# Patient Record
Sex: FEMALE | Race: WHITE
[De-identification: names, ages, dates, MRNs, and addresses within clinical notes are randomized per-mention and may not be internally consistent; named-entity substitution may affect disease eponyms.]

---

## 2017-05-01 ENCOUNTER — HOSPITAL ENCOUNTER (OUTPATIENT)
Dept: HOSPITAL 23 - CAMB | Age: 67
Discharge: HOME | End: 2017-05-01
Payer: COMMERCIAL

## 2017-05-01 DIAGNOSIS — M85.88: ICD-10-CM

## 2017-05-01 DIAGNOSIS — M16.12: ICD-10-CM

## 2017-05-01 DIAGNOSIS — Z01.818: Primary | ICD-10-CM

## 2017-05-01 LAB
BARBITURATES UR QL SCN: 0.7 MG/DL (ref 0.2–2)
BARBITURATES UR QL SCN: 4.2 G/DL (ref 3.5–5)
BENZODIAZ UR QL SCN: 18 U/L (ref 10–40)
BENZODIAZ UR QL SCN: 20 U/L (ref 10–42)
BLOOD UREA NITROGEN: 15 MG/DL (ref 9–23)
BUN/CREATININE RATIO: 21.42
BZE UR QL SCN: 121 U/L (ref 32–92)
CALCIUM SERUM: 9.8 MG/DL (ref 8.4–10.2)
CK MB SERPL-RTO: 12.5 % (ref 11–15.5)
CK MB SERPL-RTO: 32.4 G/DL (ref 30–36)
CREATININE SERUM: 0.7 MG/DL (ref 0.6–1.4)
GENTAMICIN PEAK SERPL-MCNC: NO MG/L
GLOM FILT RATE ESTIMATED: 90.9 ML/MIN (ref 60–?)
GLUCOSE FASTING: 93 MG/DL (ref 70–110)
HEMATOCRIT: 42.6 % (ref 35–45)
HEMOGLOBIN: 13.8 GM/DL (ref 12–16)
INR: 0.9
KETONES UR QL: 107 MMOL/L (ref 100–111)
KETONES UR QL: 25 MMOL/L (ref 22–31)
MEAN CELL VOLUME: 92.3 FL (ref 83–96)
MEAN CORPUSCULAR HEMOGLOBIN: 29.9 PG (ref 28–34)
MEAN PLATELET VOLUME: 8.6 FL (ref 6.5–11.5)
PLATELET COUNT: 289 X10E3 (ref 140–420)
POTASSIUM: 4.3 MMOL/L (ref 3.5–5.1)
PROTEIN TOTAL SERUM: 6.8 G/DL (ref 6–8.3)
PROTHROMBIN TIME (PATIENT): 9.4 SECONDS (ref 9.6–11.5)
RED BLOOD COUNT: 4.62 X10E (ref 3.9–5.3)
SODIUM: 141 MMOL/L (ref 135–145)
URINE APPEARANCE: CLEAR
URINE BILIRUBIN: (no result)
URINE BLOOD: (no result)
URINE COLOR: YELLOW
URINE GLUCOSE: (no result) MG/DL
URINE KETONE: (no result)
URINE LEUKOCYTE ESTERASE: (no result)
URINE NITRATE: (no result)
URINE PH: 7 (ref 5–8)
URINE PROTEIN: (no result)
URINE SOURCE: (no result)
URINE SPECIFIC GRAVITY: 1.01 (ref 1–1.03)
URINE UROBILINOGEN: 0.2 MG/DL
WHITE BLOOD COUNT: 8.8 X10E3 (ref 4–10.5)

## 2017-05-08 ENCOUNTER — HOSPITAL ENCOUNTER (INPATIENT)
Dept: HOSPITAL 23 - CSUR | Age: 67
LOS: 1 days | Discharge: HOME HEALTH | DRG: 470 | End: 2017-05-09
Attending: ORTHOPAEDIC SURGERY | Admitting: ORTHOPAEDIC SURGERY
Payer: MEDICARE

## 2017-05-08 DIAGNOSIS — M16.12: Primary | ICD-10-CM

## 2017-05-08 DIAGNOSIS — Z90.49: ICD-10-CM

## 2017-05-08 DIAGNOSIS — M87.9: ICD-10-CM

## 2017-05-08 LAB
INR: 0.9
PROTHROMBIN TIME (PATIENT): 9.9 SECONDS

## 2017-05-08 PROCEDURE — C1776 JOINT DEVICE (IMPLANTABLE): HCPCS

## 2017-05-08 PROCEDURE — 0SRB0J9 REPLACEMENT OF LEFT HIP JOINT WITH SYNTHETIC SUBSTITUTE, CEMENTED, OPEN APPROACH: ICD-10-PCS | Performed by: ORTHOPAEDIC SURGERY

## 2017-05-08 PROCEDURE — 0QP904Z REMOVAL OF INTERNAL FIXATION DEVICE FROM LEFT FEMORAL SHAFT, OPEN APPROACH: ICD-10-PCS | Performed by: ORTHOPAEDIC SURGERY

## 2017-05-09 LAB
BASOPHIL#: 0 X10E3
BASOPHIL%: 0.5 %
BLOOD UREA NITROGEN: 12 MG/DL
BUN/CREATININE RATIO: 17.14
CALCIUM SERUM: 8.3 MG/DL
CK MB SERPL-RTO: 12.3 %
CK MB SERPL-RTO: 32.9 G/DL
CREATININE SERUM: 0.7 MG/DL
DIFF IND: NO
EOSINOPHIL#: 0.1 X10E3
EOSINOPHIL%: 1.2 %
GLOM FILT RATE ESTIMATED: 90.9 ML/MIN
GLUCOSE FASTING: 114 MG/DL
HEMATOCRIT: 30.6 %
HEMOGLOBIN: 10.1 GM/DL
KETONES UR QL: 103 MMOL/L
KETONES UR QL: 25 MMOL/L
LYMPHOCYTE#: 1.5 X10E3
LYMPHOCYTE%: 18.9 %
MAGNESIUM: 1.8 MG/DL
MEAN CELL VOLUME: 92.4 FL
MEAN CORPUSCULAR HEMOGLOBIN: 30.4 PG
MEAN PLATELET VOLUME: 10 FL
MONOCYTE#: 0.7 X10E3
MONOCYTE%: 8.4 %
NEUTROPHIL#: 5.7 X10E3
NEUTROPHIL%: 71 %
PLATELET COUNT: 122 X10E3
POTASSIUM: 4.4 MMOL/L
RED BLOOD COUNT: 3.31 X10E
SODIUM: 135 MMOL/L
WHITE BLOOD COUNT: 8.1 X10E3

## 2019-12-14 ENCOUNTER — HOSPITAL ENCOUNTER (INPATIENT)
Facility: HOSPITAL | Age: 69
LOS: 3 days | Discharge: HOME HEALTH | End: 2019-12-17
Attending: INTERNAL MEDICINE | Admitting: ORTHOPAEDIC SURGERY

## 2019-12-14 DIAGNOSIS — Z96.649 PERIPROSTHETIC FRACTURE OF SHAFT OF FEMUR: Primary | ICD-10-CM

## 2019-12-14 DIAGNOSIS — M79.605 LEFT LEG PAIN: ICD-10-CM

## 2019-12-14 DIAGNOSIS — M97.8XXA PERIPROSTHETIC FRACTURE OF SHAFT OF FEMUR: Primary | ICD-10-CM

## 2019-12-14 PROCEDURE — 25010000002 MORPHINE PER 10 MG: Performed by: INTERNAL MEDICINE

## 2019-12-14 RX ORDER — OXYCODONE HYDROCHLORIDE AND ACETAMINOPHEN 5; 325 MG/1; MG/1
1 TABLET ORAL EVERY 4 HOURS PRN
Status: DISCONTINUED | OUTPATIENT
Start: 2019-12-14 | End: 2019-12-16

## 2019-12-14 RX ORDER — MORPHINE SULFATE 2 MG/ML
2 INJECTION, SOLUTION INTRAMUSCULAR; INTRAVENOUS EVERY 4 HOURS PRN
Status: DISCONTINUED | OUTPATIENT
Start: 2019-12-14 | End: 2019-12-17 | Stop reason: HOSPADM

## 2019-12-14 RX ORDER — SODIUM CHLORIDE 9 MG/ML
75 INJECTION, SOLUTION INTRAVENOUS CONTINUOUS
Status: DISCONTINUED | OUTPATIENT
Start: 2019-12-14 | End: 2019-12-16

## 2019-12-14 RX ORDER — SODIUM CHLORIDE 0.9 % (FLUSH) 0.9 %
10 SYRINGE (ML) INJECTION AS NEEDED
Status: DISCONTINUED | OUTPATIENT
Start: 2019-12-14 | End: 2019-12-17 | Stop reason: HOSPADM

## 2019-12-14 RX ORDER — ONDANSETRON 2 MG/ML
4 INJECTION INTRAMUSCULAR; INTRAVENOUS EVERY 6 HOURS PRN
Status: DISCONTINUED | OUTPATIENT
Start: 2019-12-14 | End: 2019-12-17 | Stop reason: HOSPADM

## 2019-12-14 RX ORDER — SODIUM CHLORIDE 0.9 % (FLUSH) 0.9 %
10 SYRINGE (ML) INJECTION EVERY 12 HOURS SCHEDULED
Status: DISCONTINUED | OUTPATIENT
Start: 2019-12-14 | End: 2019-12-17 | Stop reason: HOSPADM

## 2019-12-14 RX ADMIN — OXYCODONE AND ACETAMINOPHEN 1 TABLET: 5; 325 TABLET ORAL at 20:32

## 2019-12-14 RX ADMIN — MORPHINE SULFATE 2 MG: 2 INJECTION, SOLUTION INTRAMUSCULAR; INTRAVENOUS at 23:04

## 2019-12-14 RX ADMIN — SODIUM CHLORIDE 75 ML/HR: 9 INJECTION, SOLUTION INTRAVENOUS at 22:38

## 2019-12-15 ENCOUNTER — APPOINTMENT (OUTPATIENT)
Dept: GENERAL RADIOLOGY | Facility: HOSPITAL | Age: 69
End: 2019-12-15

## 2019-12-15 ENCOUNTER — ANESTHESIA (OUTPATIENT)
Dept: PERIOP | Facility: HOSPITAL | Age: 69
End: 2019-12-15

## 2019-12-15 ENCOUNTER — ANESTHESIA EVENT (OUTPATIENT)
Dept: PERIOP | Facility: HOSPITAL | Age: 69
End: 2019-12-15

## 2019-12-15 PROBLEM — M79.605 LEFT LEG PAIN: Status: ACTIVE | Noted: 2019-12-15

## 2019-12-15 LAB
ALBUMIN SERPL-MCNC: 3.5 G/DL (ref 3.5–5.2)
ALBUMIN/GLOB SERPL: 1.2 G/DL
ALP SERPL-CCNC: 313 U/L (ref 39–117)
ALT SERPL W P-5'-P-CCNC: 129 U/L (ref 1–33)
ANION GAP SERPL CALCULATED.3IONS-SCNC: 13.2 MMOL/L (ref 5–15)
AST SERPL-CCNC: 108 U/L (ref 1–32)
BASOPHILS # BLD AUTO: 0.03 10*3/MM3 (ref 0–0.2)
BASOPHILS NFR BLD AUTO: 0.4 % (ref 0–1.5)
BILIRUB SERPL-MCNC: 0.6 MG/DL (ref 0.2–1.2)
BUN BLD-MCNC: 17 MG/DL (ref 8–23)
BUN/CREAT SERPL: 29.8 (ref 7–25)
CALCIUM SPEC-SCNC: 8.8 MG/DL (ref 8.6–10.5)
CHLORIDE SERPL-SCNC: 103 MMOL/L (ref 98–107)
CO2 SERPL-SCNC: 23.8 MMOL/L (ref 22–29)
CREAT BLD-MCNC: 0.57 MG/DL (ref 0.57–1)
DEPRECATED RDW RBC AUTO: 43.8 FL (ref 37–54)
EOSINOPHIL # BLD AUTO: 0.06 10*3/MM3 (ref 0–0.4)
EOSINOPHIL NFR BLD AUTO: 0.8 % (ref 0.3–6.2)
ERYTHROCYTE [DISTWIDTH] IN BLOOD BY AUTOMATED COUNT: 13.2 % (ref 12.3–15.4)
GFR SERPL CREATININE-BSD FRML MDRD: 105 ML/MIN/1.73
GLOBULIN UR ELPH-MCNC: 2.9 GM/DL
GLUCOSE BLD-MCNC: 90 MG/DL (ref 65–99)
HCT VFR BLD AUTO: 34.8 % (ref 34–46.6)
HGB BLD-MCNC: 11.4 G/DL (ref 12–15.9)
IMM GRANULOCYTES # BLD AUTO: 0.02 10*3/MM3 (ref 0–0.05)
IMM GRANULOCYTES NFR BLD AUTO: 0.3 % (ref 0–0.5)
LYMPHOCYTES # BLD AUTO: 1.27 10*3/MM3 (ref 0.7–3.1)
LYMPHOCYTES NFR BLD AUTO: 17.7 % (ref 19.6–45.3)
MCH RBC QN AUTO: 29.4 PG (ref 26.6–33)
MCHC RBC AUTO-ENTMCNC: 32.8 G/DL (ref 31.5–35.7)
MCV RBC AUTO: 89.7 FL (ref 79–97)
MONOCYTES # BLD AUTO: 0.6 10*3/MM3 (ref 0.1–0.9)
MONOCYTES NFR BLD AUTO: 8.4 % (ref 5–12)
NEUTROPHILS # BLD AUTO: 5.19 10*3/MM3 (ref 1.7–7)
NEUTROPHILS NFR BLD AUTO: 72.4 % (ref 42.7–76)
NRBC BLD AUTO-RTO: 0 /100 WBC (ref 0–0.2)
PLATELET # BLD AUTO: 309 10*3/MM3 (ref 140–450)
PMV BLD AUTO: 9.7 FL (ref 6–12)
POTASSIUM BLD-SCNC: 4.1 MMOL/L (ref 3.5–5.2)
PROT SERPL-MCNC: 6.4 G/DL (ref 6–8.5)
RBC # BLD AUTO: 3.88 10*6/MM3 (ref 3.77–5.28)
SODIUM BLD-SCNC: 140 MMOL/L (ref 136–145)
WBC NRBC COR # BLD: 7.17 10*3/MM3 (ref 3.4–10.8)

## 2019-12-15 PROCEDURE — C1713 ANCHOR/SCREW BN/BN,TIS/BN: HCPCS | Performed by: ORTHOPAEDIC SURGERY

## 2019-12-15 PROCEDURE — 73552 X-RAY EXAM OF FEMUR 2/>: CPT

## 2019-12-15 PROCEDURE — 80053 COMPREHEN METABOLIC PANEL: CPT | Performed by: INTERNAL MEDICINE

## 2019-12-15 PROCEDURE — 25010000002 MIDAZOLAM PER 1 MG: Performed by: ANESTHESIOLOGY

## 2019-12-15 PROCEDURE — 73560 X-RAY EXAM OF KNEE 1 OR 2: CPT

## 2019-12-15 PROCEDURE — 76000 FLUOROSCOPY <1 HR PHYS/QHP: CPT

## 2019-12-15 PROCEDURE — 25010000002 HYDROMORPHONE PER 4 MG: Performed by: ANESTHESIOLOGY

## 2019-12-15 PROCEDURE — 0SPB04Z REMOVAL OF INTERNAL FIXATION DEVICE FROM LEFT HIP JOINT, OPEN APPROACH: ICD-10-PCS | Performed by: ORTHOPAEDIC SURGERY

## 2019-12-15 PROCEDURE — 0SHB04Z INSERTION OF INTERNAL FIXATION DEVICE INTO LEFT HIP JOINT, OPEN APPROACH: ICD-10-PCS | Performed by: ORTHOPAEDIC SURGERY

## 2019-12-15 PROCEDURE — 25010000002 MORPHINE PER 10 MG: Performed by: INTERNAL MEDICINE

## 2019-12-15 PROCEDURE — 93005 ELECTROCARDIOGRAM TRACING: CPT | Performed by: ORTHOPAEDIC SURGERY

## 2019-12-15 PROCEDURE — 25010000002 ONDANSETRON PER 1 MG: Performed by: ORTHOPAEDIC SURGERY

## 2019-12-15 PROCEDURE — 73502 X-RAY EXAM HIP UNI 2-3 VIEWS: CPT

## 2019-12-15 PROCEDURE — 25010000003 CEFAZOLIN IN DEXTROSE 2-4 GM/100ML-% SOLUTION: Performed by: ORTHOPAEDIC SURGERY

## 2019-12-15 PROCEDURE — 25010000002 PROPOFOL 10 MG/ML EMULSION: Performed by: ANESTHESIOLOGY

## 2019-12-15 PROCEDURE — 85025 COMPLETE CBC W/AUTO DIFF WBC: CPT | Performed by: INTERNAL MEDICINE

## 2019-12-15 PROCEDURE — 93010 ELECTROCARDIOGRAM REPORT: CPT | Performed by: INTERNAL MEDICINE

## 2019-12-15 PROCEDURE — 71046 X-RAY EXAM CHEST 2 VIEWS: CPT

## 2019-12-15 PROCEDURE — 25010000002 FENTANYL CITRATE (PF) 100 MCG/2ML SOLUTION: Performed by: ANESTHESIOLOGY

## 2019-12-15 PROCEDURE — 25010000002 KETOROLAC TROMETHAMINE PER 15 MG: Performed by: ORTHOPAEDIC SURGERY

## 2019-12-15 PROCEDURE — 72170 X-RAY EXAM OF PELVIS: CPT

## 2019-12-15 DEVICE — NCB SCREW D 4.0 SELF-TAPPING, 42
Type: IMPLANTABLE DEVICE | Site: FEMUR | Status: FUNCTIONAL
Brand: NCB®

## 2019-12-15 DEVICE — CABL CERCLG GTR COCR 1.8MM 63.5CM: Type: IMPLANTABLE DEVICE | Site: FEMUR | Status: FUNCTIONAL

## 2019-12-15 DEVICE — NCB  SCREW 5.0, L = 34
Type: IMPLANTABLE DEVICE | Site: FEMUR | Status: FUNCTIONAL
Brand: NCB®

## 2019-12-15 DEVICE — NCB SCREW 5.0, L = 30
Type: IMPLANTABLE DEVICE | Site: FEMUR | Status: FUNCTIONAL
Brand: NCB®

## 2019-12-15 DEVICE — NCB CLAMP-SCREW
Type: IMPLANTABLE DEVICE | Site: FEMUR | Status: FUNCTIONAL
Brand: NCB®

## 2019-12-15 DEVICE — NCB SCREW D 4.0 SELF-TAPPING, 46
Type: IMPLANTABLE DEVICE | Site: FEMUR | Status: FUNCTIONAL
Brand: NCB®

## 2019-12-15 DEVICE — NCB SCREW D 4.0 SELF-TAPPING, 44
Type: IMPLANTABLE DEVICE | Site: FEMUR | Status: FUNCTIONAL
Brand: NCB®

## 2019-12-15 DEVICE — NCB SCREW 5.0   L = 36
Type: IMPLANTABLE DEVICE | Site: FEMUR | Status: FUNCTIONAL
Brand: NCB®

## 2019-12-15 DEVICE — NCB SCREW D 4.0 SELF-TAPPING, 48
Type: IMPLANTABLE DEVICE | Site: FEMUR | Status: FUNCTIONAL
Brand: NCB®

## 2019-12-15 DEVICE — NCB SCREW D 4.0 SELF-TAPPING, 32
Type: IMPLANTABLE DEVICE | Site: FEMUR | Status: FUNCTIONAL
Brand: NCB®

## 2019-12-15 DEVICE — NCB SCREW 5.0   L = 34
Type: IMPLANTABLE DEVICE | Site: FEMUR | Status: FUNCTIONAL
Brand: NCB®

## 2019-12-15 DEVICE — NCB SCREW D 4.0 SELF-TAPPING, 26
Type: IMPLANTABLE DEVICE | Site: FEMUR | Status: FUNCTIONAL
Brand: NCB®

## 2019-12-15 DEVICE — NCB SCREW D 4.0 SELF-TAPPING, 38
Type: IMPLANTABLE DEVICE | Site: FEMUR | Status: FUNCTIONAL
Brand: NCB®

## 2019-12-15 DEVICE — NCB SCREW D 4.0 SELF-TAPPING, 34
Type: IMPLANTABLE DEVICE | Site: FEMUR | Status: FUNCTIONAL
Brand: NCB®

## 2019-12-15 DEVICE — NCB LOCKING CAP
Type: IMPLANTABLE DEVICE | Site: FEMUR | Status: FUNCTIONAL
Brand: NCB®

## 2019-12-15 DEVICE — BONE CCCS 20 TO 80 30CC: Type: IMPLANTABLE DEVICE | Site: FEMUR | Status: FUNCTIONAL

## 2019-12-15 DEVICE — NCB PP PROX FEM PLATE, L,15 H, L. 324MM
Type: IMPLANTABLE DEVICE | Site: FEMUR | Status: FUNCTIONAL
Brand: NCB®

## 2019-12-15 DEVICE — NCB SCREW 5.0   L = 42
Type: IMPLANTABLE DEVICE | Site: FEMUR | Status: FUNCTIONAL
Brand: NCB®

## 2019-12-15 DEVICE — NCB SCREW 5.0   L = 48
Type: IMPLANTABLE DEVICE | Site: FEMUR | Status: FUNCTIONAL
Brand: NCB®

## 2019-12-15 DEVICE — IMPLANTABLE DEVICE: Type: IMPLANTABLE DEVICE | Site: FEMUR | Status: FUNCTIONAL

## 2019-12-15 RX ORDER — PROMETHAZINE HYDROCHLORIDE 25 MG/ML
6.25 INJECTION, SOLUTION INTRAMUSCULAR; INTRAVENOUS
Status: DISCONTINUED | OUTPATIENT
Start: 2019-12-15 | End: 2019-12-15

## 2019-12-15 RX ORDER — HYDROCODONE BITARTRATE AND ACETAMINOPHEN 7.5; 325 MG/1; MG/1
1 TABLET ORAL ONCE AS NEEDED
Status: DISCONTINUED | OUTPATIENT
Start: 2019-12-15 | End: 2019-12-15

## 2019-12-15 RX ORDER — FLUMAZENIL 0.1 MG/ML
0.2 INJECTION INTRAVENOUS AS NEEDED
Status: DISCONTINUED | OUTPATIENT
Start: 2019-12-15 | End: 2019-12-15

## 2019-12-15 RX ORDER — LIDOCAINE HYDROCHLORIDE 10 MG/ML
0.5 INJECTION, SOLUTION EPIDURAL; INFILTRATION; INTRACAUDAL; PERINEURAL ONCE AS NEEDED
Status: DISCONTINUED | OUTPATIENT
Start: 2019-12-15 | End: 2019-12-15 | Stop reason: HOSPADM

## 2019-12-15 RX ORDER — SODIUM CHLORIDE, SODIUM LACTATE, POTASSIUM CHLORIDE, CALCIUM CHLORIDE 600; 310; 30; 20 MG/100ML; MG/100ML; MG/100ML; MG/100ML
9 INJECTION, SOLUTION INTRAVENOUS CONTINUOUS
Status: DISCONTINUED | OUTPATIENT
Start: 2019-12-15 | End: 2019-12-16

## 2019-12-15 RX ORDER — KETAMINE HYDROCHLORIDE 10 MG/ML
INJECTION INTRAMUSCULAR; INTRAVENOUS AS NEEDED
Status: DISCONTINUED | OUTPATIENT
Start: 2019-12-15 | End: 2019-12-15 | Stop reason: SURG

## 2019-12-15 RX ORDER — HYDRALAZINE HYDROCHLORIDE 20 MG/ML
5 INJECTION INTRAMUSCULAR; INTRAVENOUS
Status: DISCONTINUED | OUTPATIENT
Start: 2019-12-15 | End: 2019-12-15

## 2019-12-15 RX ORDER — LABETALOL HYDROCHLORIDE 5 MG/ML
5 INJECTION, SOLUTION INTRAVENOUS
Status: DISCONTINUED | OUTPATIENT
Start: 2019-12-15 | End: 2019-12-15

## 2019-12-15 RX ORDER — NALOXONE HCL 0.4 MG/ML
0.2 VIAL (ML) INJECTION AS NEEDED
Status: DISCONTINUED | OUTPATIENT
Start: 2019-12-15 | End: 2019-12-15

## 2019-12-15 RX ORDER — FAMOTIDINE 20 MG/1
40 TABLET, FILM COATED ORAL DAILY
Status: DISCONTINUED | OUTPATIENT
Start: 2019-12-16 | End: 2019-12-17 | Stop reason: HOSPADM

## 2019-12-15 RX ORDER — SODIUM CHLORIDE 0.9 % (FLUSH) 0.9 %
3-10 SYRINGE (ML) INJECTION AS NEEDED
Status: DISCONTINUED | OUTPATIENT
Start: 2019-12-15 | End: 2019-12-15 | Stop reason: HOSPADM

## 2019-12-15 RX ORDER — UREA 10 %
1 LOTION (ML) TOPICAL NIGHTLY PRN
Status: DISCONTINUED | OUTPATIENT
Start: 2019-12-15 | End: 2019-12-17 | Stop reason: HOSPADM

## 2019-12-15 RX ORDER — PROMETHAZINE HYDROCHLORIDE 25 MG/1
25 SUPPOSITORY RECTAL ONCE AS NEEDED
Status: DISCONTINUED | OUTPATIENT
Start: 2019-12-15 | End: 2019-12-15

## 2019-12-15 RX ORDER — ACETAMINOPHEN 325 MG/1
650 TABLET ORAL ONCE AS NEEDED
Status: DISCONTINUED | OUTPATIENT
Start: 2019-12-15 | End: 2019-12-15

## 2019-12-15 RX ORDER — LIDOCAINE HYDROCHLORIDE 20 MG/ML
INJECTION, SOLUTION INFILTRATION; PERINEURAL AS NEEDED
Status: DISCONTINUED | OUTPATIENT
Start: 2019-12-15 | End: 2019-12-15 | Stop reason: SURG

## 2019-12-15 RX ORDER — ONDANSETRON 4 MG/1
4 TABLET, FILM COATED ORAL EVERY 6 HOURS PRN
Status: DISCONTINUED | OUTPATIENT
Start: 2019-12-15 | End: 2019-12-17 | Stop reason: HOSPADM

## 2019-12-15 RX ORDER — HYDROMORPHONE HYDROCHLORIDE 1 MG/ML
0.5 INJECTION, SOLUTION INTRAMUSCULAR; INTRAVENOUS; SUBCUTANEOUS
Status: DISCONTINUED | OUTPATIENT
Start: 2019-12-15 | End: 2019-12-15

## 2019-12-15 RX ORDER — SODIUM CHLORIDE, SODIUM LACTATE, POTASSIUM CHLORIDE, CALCIUM CHLORIDE 600; 310; 30; 20 MG/100ML; MG/100ML; MG/100ML; MG/100ML
100 INJECTION, SOLUTION INTRAVENOUS CONTINUOUS
Status: DISCONTINUED | OUTPATIENT
Start: 2019-12-15 | End: 2019-12-16

## 2019-12-15 RX ORDER — MAGNESIUM HYDROXIDE 1200 MG/15ML
LIQUID ORAL AS NEEDED
Status: DISCONTINUED | OUTPATIENT
Start: 2019-12-15 | End: 2019-12-15 | Stop reason: HOSPADM

## 2019-12-15 RX ORDER — OXYCODONE AND ACETAMINOPHEN 10; 325 MG/1; MG/1
1 TABLET ORAL EVERY 4 HOURS PRN
Status: DISCONTINUED | OUTPATIENT
Start: 2019-12-15 | End: 2019-12-17 | Stop reason: HOSPADM

## 2019-12-15 RX ORDER — ONDANSETRON 2 MG/ML
4 INJECTION INTRAMUSCULAR; INTRAVENOUS EVERY 6 HOURS PRN
Status: DISCONTINUED | OUTPATIENT
Start: 2019-12-15 | End: 2019-12-17 | Stop reason: HOSPADM

## 2019-12-15 RX ORDER — OXYCODONE AND ACETAMINOPHEN 7.5; 325 MG/1; MG/1
1 TABLET ORAL ONCE AS NEEDED
Status: DISCONTINUED | OUTPATIENT
Start: 2019-12-15 | End: 2019-12-15

## 2019-12-15 RX ORDER — PROPOFOL 10 MG/ML
VIAL (ML) INTRAVENOUS AS NEEDED
Status: DISCONTINUED | OUTPATIENT
Start: 2019-12-15 | End: 2019-12-15 | Stop reason: SURG

## 2019-12-15 RX ORDER — CEFAZOLIN SODIUM 2 G/100ML
2 INJECTION, SOLUTION INTRAVENOUS EVERY 8 HOURS
Status: COMPLETED | OUTPATIENT
Start: 2019-12-16 | End: 2019-12-16

## 2019-12-15 RX ORDER — CEFAZOLIN SODIUM 2 G/100ML
2 INJECTION, SOLUTION INTRAVENOUS ONCE
Status: COMPLETED | OUTPATIENT
Start: 2019-12-15 | End: 2019-12-15

## 2019-12-15 RX ORDER — KETOROLAC TROMETHAMINE 30 MG/ML
15 INJECTION, SOLUTION INTRAMUSCULAR; INTRAVENOUS EVERY 6 HOURS PRN
Status: DISCONTINUED | OUTPATIENT
Start: 2019-12-15 | End: 2019-12-17

## 2019-12-15 RX ORDER — PROMETHAZINE HYDROCHLORIDE 25 MG/ML
12.5 INJECTION, SOLUTION INTRAMUSCULAR; INTRAVENOUS ONCE AS NEEDED
Status: DISCONTINUED | OUTPATIENT
Start: 2019-12-15 | End: 2019-12-15

## 2019-12-15 RX ORDER — SODIUM CHLORIDE 0.9 % (FLUSH) 0.9 %
3 SYRINGE (ML) INJECTION EVERY 12 HOURS SCHEDULED
Status: DISCONTINUED | OUTPATIENT
Start: 2019-12-15 | End: 2019-12-15 | Stop reason: HOSPADM

## 2019-12-15 RX ORDER — ONDANSETRON 2 MG/ML
4 INJECTION INTRAMUSCULAR; INTRAVENOUS ONCE AS NEEDED
Status: DISCONTINUED | OUTPATIENT
Start: 2019-12-15 | End: 2019-12-15

## 2019-12-15 RX ORDER — DIPHENHYDRAMINE HCL 25 MG
25 CAPSULE ORAL
Status: DISCONTINUED | OUTPATIENT
Start: 2019-12-15 | End: 2019-12-15

## 2019-12-15 RX ORDER — PROMETHAZINE HYDROCHLORIDE 25 MG/1
25 TABLET ORAL ONCE AS NEEDED
Status: DISCONTINUED | OUTPATIENT
Start: 2019-12-15 | End: 2019-12-15

## 2019-12-15 RX ORDER — NALOXONE HCL 0.4 MG/ML
0.1 VIAL (ML) INJECTION
Status: DISCONTINUED | OUTPATIENT
Start: 2019-12-15 | End: 2019-12-16

## 2019-12-15 RX ORDER — FENTANYL CITRATE 50 UG/ML
50 INJECTION, SOLUTION INTRAMUSCULAR; INTRAVENOUS
Status: DISCONTINUED | OUTPATIENT
Start: 2019-12-15 | End: 2019-12-15

## 2019-12-15 RX ORDER — ACETAMINOPHEN 325 MG/1
325 TABLET ORAL EVERY 4 HOURS PRN
Status: DISCONTINUED | OUTPATIENT
Start: 2019-12-15 | End: 2019-12-17 | Stop reason: HOSPADM

## 2019-12-15 RX ORDER — ROCURONIUM BROMIDE 10 MG/ML
INJECTION, SOLUTION INTRAVENOUS AS NEEDED
Status: DISCONTINUED | OUTPATIENT
Start: 2019-12-15 | End: 2019-12-15 | Stop reason: SURG

## 2019-12-15 RX ORDER — SENNA AND DOCUSATE SODIUM 50; 8.6 MG/1; MG/1
2 TABLET, FILM COATED ORAL 2 TIMES DAILY
Status: DISCONTINUED | OUTPATIENT
Start: 2019-12-15 | End: 2019-12-17 | Stop reason: HOSPADM

## 2019-12-15 RX ORDER — SODIUM CHLORIDE 0.9 % (FLUSH) 0.9 %
3-10 SYRINGE (ML) INJECTION AS NEEDED
Status: DISCONTINUED | OUTPATIENT
Start: 2019-12-15 | End: 2019-12-17 | Stop reason: HOSPADM

## 2019-12-15 RX ORDER — BISACODYL 10 MG
10 SUPPOSITORY, RECTAL RECTAL DAILY PRN
Status: DISCONTINUED | OUTPATIENT
Start: 2019-12-15 | End: 2019-12-17 | Stop reason: HOSPADM

## 2019-12-15 RX ORDER — DIPHENHYDRAMINE HYDROCHLORIDE 50 MG/ML
12.5 INJECTION INTRAMUSCULAR; INTRAVENOUS
Status: DISCONTINUED | OUTPATIENT
Start: 2019-12-15 | End: 2019-12-15

## 2019-12-15 RX ORDER — MIDAZOLAM HYDROCHLORIDE 1 MG/ML
2 INJECTION INTRAMUSCULAR; INTRAVENOUS
Status: DISCONTINUED | OUTPATIENT
Start: 2019-12-15 | End: 2019-12-15 | Stop reason: HOSPADM

## 2019-12-15 RX ORDER — EPHEDRINE SULFATE 50 MG/ML
5 INJECTION, SOLUTION INTRAVENOUS ONCE AS NEEDED
Status: DISCONTINUED | OUTPATIENT
Start: 2019-12-15 | End: 2019-12-15

## 2019-12-15 RX ORDER — HYDROMORPHONE HYDROCHLORIDE 1 MG/ML
0.5 INJECTION, SOLUTION INTRAMUSCULAR; INTRAVENOUS; SUBCUTANEOUS
Status: DISCONTINUED | OUTPATIENT
Start: 2019-12-15 | End: 2019-12-16

## 2019-12-15 RX ORDER — HYDROMORPHONE HCL 110MG/55ML
PATIENT CONTROLLED ANALGESIA SYRINGE INTRAVENOUS AS NEEDED
Status: DISCONTINUED | OUTPATIENT
Start: 2019-12-15 | End: 2019-12-15 | Stop reason: SURG

## 2019-12-15 RX ORDER — FAMOTIDINE 10 MG/ML
20 INJECTION, SOLUTION INTRAVENOUS ONCE
Status: COMPLETED | OUTPATIENT
Start: 2019-12-15 | End: 2019-12-15

## 2019-12-15 RX ORDER — MIDAZOLAM HYDROCHLORIDE 1 MG/ML
1 INJECTION INTRAMUSCULAR; INTRAVENOUS
Status: DISCONTINUED | OUTPATIENT
Start: 2019-12-15 | End: 2019-12-15 | Stop reason: HOSPADM

## 2019-12-15 RX ORDER — SODIUM CHLORIDE 0.9 % (FLUSH) 0.9 %
3 SYRINGE (ML) INJECTION EVERY 12 HOURS SCHEDULED
Status: DISCONTINUED | OUTPATIENT
Start: 2019-12-15 | End: 2019-12-17 | Stop reason: HOSPADM

## 2019-12-15 RX ADMIN — MORPHINE SULFATE 2 MG: 2 INJECTION, SOLUTION INTRAMUSCULAR; INTRAVENOUS at 10:33

## 2019-12-15 RX ADMIN — FAMOTIDINE 20 MG: 10 INJECTION INTRAVENOUS at 17:21

## 2019-12-15 RX ADMIN — HYDROMORPHONE HYDROCHLORIDE 0.5 MG: 2 INJECTION, SOLUTION INTRAMUSCULAR; INTRAVENOUS; SUBCUTANEOUS at 18:13

## 2019-12-15 RX ADMIN — HYDROMORPHONE HYDROCHLORIDE 0.5 MG: 2 INJECTION, SOLUTION INTRAMUSCULAR; INTRAVENOUS; SUBCUTANEOUS at 19:43

## 2019-12-15 RX ADMIN — HYDROMORPHONE HYDROCHLORIDE 0.5 MG: 1 INJECTION, SOLUTION INTRAMUSCULAR; INTRAVENOUS; SUBCUTANEOUS at 21:28

## 2019-12-15 RX ADMIN — KETOROLAC TROMETHAMINE 15 MG: 30 INJECTION, SOLUTION INTRAMUSCULAR at 23:40

## 2019-12-15 RX ADMIN — FENTANYL CITRATE 50 MCG: 50 INJECTION, SOLUTION INTRAMUSCULAR; INTRAVENOUS at 22:09

## 2019-12-15 RX ADMIN — SODIUM CHLORIDE, POTASSIUM CHLORIDE, SODIUM LACTATE AND CALCIUM CHLORIDE 100 ML/HR: 600; 310; 30; 20 INJECTION, SOLUTION INTRAVENOUS at 22:56

## 2019-12-15 RX ADMIN — CEFAZOLIN SODIUM 2 G: 2 INJECTION, SOLUTION INTRAVENOUS at 18:01

## 2019-12-15 RX ADMIN — MORPHINE SULFATE 2 MG: 2 INJECTION, SOLUTION INTRAMUSCULAR; INTRAVENOUS at 14:19

## 2019-12-15 RX ADMIN — KETAMINE HYDROCHLORIDE 20 MG: 10 INJECTION INTRAMUSCULAR; INTRAVENOUS at 18:13

## 2019-12-15 RX ADMIN — KETAMINE HYDROCHLORIDE 10 MG: 10 INJECTION INTRAMUSCULAR; INTRAVENOUS at 19:11

## 2019-12-15 RX ADMIN — MIDAZOLAM 1 MG: 1 INJECTION INTRAMUSCULAR; INTRAVENOUS at 17:21

## 2019-12-15 RX ADMIN — ROCURONIUM BROMIDE 30 MG: 10 INJECTION INTRAVENOUS at 17:59

## 2019-12-15 RX ADMIN — SODIUM CHLORIDE, POTASSIUM CHLORIDE, SODIUM LACTATE AND CALCIUM CHLORIDE: 600; 310; 30; 20 INJECTION, SOLUTION INTRAVENOUS at 20:40

## 2019-12-15 RX ADMIN — HYDROMORPHONE HYDROCHLORIDE 0.5 MG: 2 INJECTION, SOLUTION INTRAMUSCULAR; INTRAVENOUS; SUBCUTANEOUS at 19:51

## 2019-12-15 RX ADMIN — ONDANSETRON 4 MG: 2 INJECTION INTRAMUSCULAR; INTRAVENOUS at 23:40

## 2019-12-15 RX ADMIN — SODIUM CHLORIDE, PRESERVATIVE FREE 10 ML: 5 INJECTION INTRAVENOUS at 08:29

## 2019-12-15 RX ADMIN — PROPOFOL 150 MG: 10 INJECTION, EMULSION INTRAVENOUS at 17:59

## 2019-12-15 RX ADMIN — LIDOCAINE HYDROCHLORIDE 100 MG: 20 INJECTION, SOLUTION INFILTRATION; PERINEURAL at 17:59

## 2019-12-15 RX ADMIN — OXYCODONE HYDROCHLORIDE AND ACETAMINOPHEN 1 TABLET: 10; 325 TABLET ORAL at 22:55

## 2019-12-15 RX ADMIN — MORPHINE SULFATE 2 MG: 2 INJECTION, SOLUTION INTRAMUSCULAR; INTRAVENOUS at 06:55

## 2019-12-15 RX ADMIN — FENTANYL CITRATE 50 MCG: 50 INJECTION, SOLUTION INTRAMUSCULAR; INTRAVENOUS at 21:05

## 2019-12-15 RX ADMIN — FENTANYL CITRATE 50 MCG: 50 INJECTION, SOLUTION INTRAMUSCULAR; INTRAVENOUS at 21:46

## 2019-12-15 RX ADMIN — HYDROMORPHONE HYDROCHLORIDE 0.5 MG: 2 INJECTION, SOLUTION INTRAMUSCULAR; INTRAVENOUS; SUBCUTANEOUS at 18:37

## 2019-12-15 RX ADMIN — SODIUM CHLORIDE, POTASSIUM CHLORIDE, SODIUM LACTATE AND CALCIUM CHLORIDE 9 ML/HR: 600; 310; 30; 20 INJECTION, SOLUTION INTRAVENOUS at 17:21

## 2019-12-15 RX ADMIN — FENTANYL CITRATE 50 MCG: 50 INJECTION, SOLUTION INTRAMUSCULAR; INTRAVENOUS at 21:26

## 2019-12-15 RX ADMIN — MORPHINE SULFATE 2 MG: 2 INJECTION, SOLUTION INTRAMUSCULAR; INTRAVENOUS at 03:21

## 2019-12-15 NOTE — PLAN OF CARE
Problem: Patient Care Overview  Goal: Plan of Care Review  Outcome: Ongoing (interventions implemented as appropriate)  Flowsheets (Taken 12/15/2019 0254)  Progress: improving  Plan of Care Reviewed With: patient  Outcome Summary: Admitted for L femur fx. On bedrest, NPO at MN, ortho consulted. Pain well controlled. Fluids infusing, purewick in place. No comorbidities to educate. Will continue to monitor.

## 2019-12-15 NOTE — CONSULTS
ORTHOPEDIC SURGERY CONSULT      Patient: Susie Godinez  Date of Admission: 12/14/2019  7:08 PM  YOB: 1950  Medical Record Number: [id number]  Attending Physician: Uche Brink MD  Consulting Physician: Kenny García MD    CHIEF COMPLIANT: Left thigh pain.    HISTORY OF PRESENT ILLINESS: Patient is a 69 y.o. year old female presents to [facility] with above complaints.  I was consulted for further evaluation and treatment. She is a known patient to myself with history of left DAPHNE.  She sustained a eleazar-prosthetic femur fracture that was treated by my partner Dr. Edy Roy with ORIF.  She did well for several months and had returned back to walking.  She had a pop and had worsening pain.  [facility] which showed a broken plate and displaced eleazar-prosthetic fracture with chronic non-union.  She was transferred to [facility] for definitive management.      ALLERGIES: No Known Allergies    HOME MEDICATIONS:  No medications prior to admission.       CURRENT MEDICATIONS:  Scheduled Meds:  ceFAZolin 2 g Intravenous Once   [MAR Hold] sodium chloride 10 mL Intravenous Q12H     Continuous Infusions:  sodium chloride 75 mL/hr Last Rate: 75 mL/hr (12/15/19 0635)     PRN Meds:.Morphine  •  ondansetron  •  oxyCODONE-acetaminophen  •  [MAR Hold] sodium chloride    History reviewed. No pertinent past medical history.  History reviewed. No pertinent surgical history.  Social History     Occupational History   • Not on file   Tobacco Use   • Smoking status: Former Smoker   • Smokeless tobacco: Never Used   Substance and Sexual Activity   • Alcohol use: Not on file   • Drug use: Not on file   • Sexual activity: Defer    Social History     Social History Narrative   • Not on file     History reviewed. No pertinent family history.    REVIEW OF SYSTEMS:    HEENT: Patient denies any headaches, vision changes, change in hearing, or tinnitus,  Patient denies epistaxis, sinus pain, hoarseness, or dysphagia   Pulmonary: Patient denies any cough, congestion, acute change in SOA or wheezing.   Cardiovascular: Patient denies any change in chest pain, dyspnea, palpitations, weakness, intolerance of exercise, varicosities, change in murmur   Gastrointestinal:  Patient denies change in appetite, melena, change in bowel habits.   Genital/Urinary: Patient denies dysuria, change in color of urine, change in frequency of urination, pain with urgency, change in incontinence, retention.   Musculoskeletal: Patient denies complaints of acute changes in symptoms of other joints not mentioned above.   Neurological: Patient denies changes in dizziness, tremor, ataxia, or difficulty in speaking or changes in memory.   Endocrine system: Patient denies acute changes in tremors, palpitations, polyuria, polydipsia, polyphagia, diaphoresis, exophthalmos, or goiter.   Psychological: Patient denies thoughts/plans or harming self or other; denies acute changes in depression,  insomnia, night terrors, alonzo, disorientation.   Skin: Patient denies any bruising, rashes, discoloration, pruritus,or wounds not mentioned in history of present illness or chief complaint above.   Hematopoietic: Patient denies current bleeding, epistaxis, hematuria, or melena.    PHYSICAL EXAM:   Vitals:  Vitals:    12/15/19 0700 12/15/19 1100 12/15/19 1445 12/15/19 1517   BP: 124/65 118/61 124/70 133/80   BP Location: Left arm Left arm Left arm Right arm   Patient Position: Lying Lying Lying Lying   Pulse: 77 76 79 76   Resp: 16 16 18 18   Temp: 99.8 °F (37.7 °C) 98.5 °F (36.9 °C) 99.1 °F (37.3 °C) 99.8 °F (37.7 °C)   TempSrc: Oral Oral Oral Oral   SpO2: 94% 96% 97% 94%   Weight:       Height:           General:  69 y.o. female who appears about stated age.    Alert, cooperative, in no acute distress         Head:    Normocephalic, without obvious abnormality, atraumatic   Eyes:            Lids and lashes  normal, conjunctivae and sclerae normal, no         icterus, no pallor, corneas clear, PERRLA   Ears:    Ears appear intact with no abnormalities noted   Throat:   No oral lesions, no thrush, oral mucosa moist   Neck:   No adenopathy, supple, trachea midline, no JVD   Back:     Limited exam shows no severe kyphosis present,no visible           erythema, no excessive  tenderness to palpation.    Lungs:     Respirations regular, even and unlabored.     Heart:    Normal rate, Pulses palpable   Chest Wall:    No abnormalities observed.   Abdomen:     Normal bowel sounds, no masses, no organomegaly, soft              non-tender, non-distended, no guarding, no rebound                      tenderness   Rectal:     Deferred   Pulses:   Pulses palpable and equal bilaterally   Skin:   No bleeding, bruising or rash   Lymph nodes:   No palpable adenopathy   Extremities:     Left leg: splinted.  Incision intact.  Wiggles toes.  NVI intact.      DIAGNOSTIC TEST:  Admission on 12/14/2019   Component Date Value Ref Range Status   • Glucose 12/15/2019 90  65 - 99 mg/dL Final   • BUN 12/15/2019 17  8 - 23 mg/dL Final   • Creatinine 12/15/2019 0.57  0.57 - 1.00 mg/dL Final   • Sodium 12/15/2019 140  136 - 145 mmol/L Final   • Potassium 12/15/2019 4.1  3.5 - 5.2 mmol/L Final   • Chloride 12/15/2019 103  98 - 107 mmol/L Final   • CO2 12/15/2019 23.8  22.0 - 29.0 mmol/L Final   • Calcium 12/15/2019 8.8  8.6 - 10.5 mg/dL Final   • Total Protein 12/15/2019 6.4  6.0 - 8.5 g/dL Final   • Albumin 12/15/2019 3.50  3.50 - 5.20 g/dL Final   • ALT (SGPT) 12/15/2019 129* 1 - 33 U/L Final   • AST (SGOT) 12/15/2019 108* 1 - 32 U/L Final   • Alkaline Phosphatase 12/15/2019 313* 39 - 117 U/L Final   • Total Bilirubin 12/15/2019 0.6  0.2 - 1.2 mg/dL Final   • eGFR Non African Amer 12/15/2019 105  >60 mL/min/1.73 Final   • Globulin 12/15/2019 2.9  gm/dL Final   • A/G Ratio 12/15/2019 1.2  g/dL Final   • BUN/Creatinine Ratio 12/15/2019 29.8* 7.0 - 25.0  Final   • Anion Gap 12/15/2019 13.2  5.0 - 15.0 mmol/L Final   • WBC 12/15/2019 7.17  3.40 - 10.80 10*3/mm3 Final   • RBC 12/15/2019 3.88  3.77 - 5.28 10*6/mm3 Final   • Hemoglobin 12/15/2019 11.4* 12.0 - 15.9 g/dL Final   • Hematocrit 12/15/2019 34.8  34.0 - 46.6 % Final   • MCV 12/15/2019 89.7  79.0 - 97.0 fL Final   • MCH 12/15/2019 29.4  26.6 - 33.0 pg Final   • MCHC 12/15/2019 32.8  31.5 - 35.7 g/dL Final   • RDW 12/15/2019 13.2  12.3 - 15.4 % Final   • RDW-SD 12/15/2019 43.8  37.0 - 54.0 fl Final   • MPV 12/15/2019 9.7  6.0 - 12.0 fL Final   • Platelets 12/15/2019 309  140 - 450 10*3/mm3 Final   • Neutrophil % 12/15/2019 72.4  42.7 - 76.0 % Final   • Lymphocyte % 12/15/2019 17.7* 19.6 - 45.3 % Final   • Monocyte % 12/15/2019 8.4  5.0 - 12.0 % Final   • Eosinophil % 12/15/2019 0.8  0.3 - 6.2 % Final   • Basophil % 12/15/2019 0.4  0.0 - 1.5 % Final   • Immature Grans % 12/15/2019 0.3  0.0 - 0.5 % Final   • Neutrophils, Absolute 12/15/2019 5.19  1.70 - 7.00 10*3/mm3 Final   • Lymphocytes, Absolute 12/15/2019 1.27  0.70 - 3.10 10*3/mm3 Final   • Monocytes, Absolute 12/15/2019 0.60  0.10 - 0.90 10*3/mm3 Final   • Eosinophils, Absolute 12/15/2019 0.06  0.00 - 0.40 10*3/mm3 Final   • Basophils, Absolute 12/15/2019 0.03  0.00 - 0.20 10*3/mm3 Final   • Immature Grans, Absolute 12/15/2019 0.02  0.00 - 0.05 10*3/mm3 Final   • nRBC 12/15/2019 0.0  0.0 - 0.2 /100 WBC Final       No results found.      ASSESSMENT:  Left eleazar-prosthetic femur fracture    Patient Active Problem List   Diagnosis   • Periprosthetic fracture of shaft of femur   • Left leg pain       PLAN:    Revision ORIF eleazar-prosthetic fracture with cortical strut graft.  Risks and benefits were discussed in detail.  Risk of non-union, malunion, painful hardware, possible need for revision to proximal femur replacement.       The above diagnosis and treatment plan was discussed with the patient.  They were educated in treatment options for their condition.    They were given the opportunity to ask questions and were answered to their satisfaction.  They agreed to proceed with the above treatment plan.        Kenny García MD  Date: 12/15/2019

## 2019-12-15 NOTE — PROGRESS NOTES
"    DAILY PROGRESS NOTE  [facility]    Patient Identification:  Name: Susie Godinez  Age: 69 y.o.  Sex: female  :  1950  MRN: [id number]         Primary Care Physician: Fabi Dhillon MD    Subjective:  Interval History: Pain control improved.  Denies chest pain shortness of breath altered mentation nausea or vomiting    Objective: Sitting up in bed.  Interactive and conversational.  Pain control appears adequate    Scheduled Meds:    sodium chloride 10 mL Intravenous Q12H     Continuous Infusions:    sodium chloride 75 mL/hr Last Rate: 75 mL/hr (12/15/19 0635)       Vital signs in last 24 hours:  Temp:  [98.2 °F (36.8 °C)-99.8 °F (37.7 °C)] 99.8 °F (37.7 °C)  Heart Rate:  [68-83] 77  Resp:  [16-18] 16  BP: ()/(53-65) 124/65    Intake/Output:    Intake/Output Summary (Last 24 hours) at 12/15/2019 1008  Last data filed at 12/15/2019 0900  Gross per 24 hour   Intake 586.51 ml   Output --   Net 586.51 ml       Exam:  /65 (BP Location: Left arm, Patient Position: Lying)   Pulse 77   Temp 99.8 °F (37.7 °C) (Oral)   Resp 16   Ht 157.5 cm (62\")   Wt 70.5 kg (155 lb 6.8 oz)   SpO2 94%   BMI 28.43 kg/m²     General Appearance:    Alert, cooperative, no distress, AAOx3                          Head:    Normocephalic, without obvious abnormality, atraumatic                         Throat:   Oral mucosa pink and moist                           Neck:   No JVD                         Lungs:    Clear to auscultation bilaterally, respirations unlabored                 Chest Wall:    No tenderness or deformity                          Heart:    Regular rate and rhythm, S1 and S2 normal                  Abdomen:     Soft, non-tender, bowel sounds active                 Extremities:   No cyanosis or edema                        Pulses:   Pulses palpable in lower extremities                            Data Review:  Labs in chart were reviewed.    Assessment:  Active Hospital Problems "    Diagnosis  POA   • Left leg pain [M79.605]  Unknown   • Periprosthetic fracture of shaft of femur [M97.8XXA, Z96.649]  Unknown      Resolved Hospital Problems   No resolved problems to display.       Plan:    Awaiting further recommendations from orthopedics in regards to surgical correction   -Pain control adequate at this time   -Not taking any home meds   -Postoperative DVT prophylaxis suggested    Uche Brink MD  12/15/2019  10:08 AM

## 2019-12-15 NOTE — ANESTHESIA PREPROCEDURE EVALUATION
Anesthesia Evaluation     Patient summary reviewed and Nursing notes reviewed   NPO Solid Status: > 8 hours  NPO Liquid Status: > 8 hours           Airway   Mallampati: II  TM distance: >3 FB  Neck ROM: full  no difficulty expected  Dental - normal exam     Pulmonary - negative pulmonary ROS and normal exam   Cardiovascular - negative cardio ROS and normal exam        Neuro/Psych- negative ROS  GI/Hepatic/Renal/Endo - negative ROS     Musculoskeletal (-) negative ROS    Abdominal  - normal exam   Substance History - negative use     OB/GYN          Other - negative ROS                       Anesthesia Plan    ASA 2     general     intravenous induction     Anesthetic plan, all risks, benefits, and alternatives have been provided, discussed and informed consent has been obtained with: patient.

## 2019-12-15 NOTE — H&P
Internal medicine history and physical  INTERNAL MEDICINE   [facility]       Patient Identification:  Name: Susie Godinez  Age: 69 y.o.  Sex: female  :  1950  MRN: [id number]                   Primary Care Physician: Fabi Dhillon MD                                   Chief Complaint: Pain and discomfort in the left thigh since Thursday morning when she attempted to get out of the car at the [facility] parking lot.    History of Present Illness:   Patient is a 69-year-old female whose past medical history is remarkable for multiple orthopedic surgery involving the left hip and left femur with the last procedure performed in 2019 where more hardware was placed on her left femur at [facility].  According to the patient she has done well since then and was able to ambulate and do her routines and life and drove with her  to [facility] Thursday morning.  As she was getting out of the car she felt significant pain discomfort in her left thigh around her knee.  She did some shopping but because the pain cut it short and went home.  Her pain continued to do progress to the point that she decided that something is not right and it needs to be checked out.  She went to [facility] where x-rays were done but according to her they did not check her hip and thigh.  They told her that everything is okay and she was discharged.  She suffered with increasing pain and progressive mobility throughout Friday and then decided to go to hospital in [place] did get more x-rays done including the left thigh and leg and hip.  Work-up over the revealed left femoral shaft periprosthetic fracture.  Her case was discussed with orthopedic surgery service and patient was accepted in transfer to our facility for further care.  Internal medicine service was requested to expedite this arrangement and patient was admitted to our service.      Past Medical History:  No  past medical history on file.  Past Surgical History:  No past surgical history on file.   Home Meds:  No medications prior to admission.     Current Meds:     Current Facility-Administered Medications:   •  morphine injection 2 mg, 2 mg, Intravenous, Q4H PRN, Cheryl Denny MD  •  ondansetron (ZOFRAN) injection 4 mg, 4 mg, Intravenous, Q6H PRN, Cheryl Denny MD  •  oxyCODONE-acetaminophen (PERCOCET) 5-325 MG per tablet 1 tablet, 1 tablet, Oral, Q4H PRN, Cheryl Denny MD, 1 tablet at 12/14/19 2032  •  sodium chloride 0.9 % infusion, 75 mL/hr, Intravenous, Continuous, Cheryl Denny MD, Last Rate: 75 mL/hr at 12/14/19 2238, 75 mL/hr at 12/14/19 2238  Allergies:  No Known Allergies  Social History:   Social History     Tobacco Use   • Smoking status: Not on file   Substance Use Topics   • Alcohol use: Not on file      Family History:  No family history on file.       Review of Systems  See history of present illness and past medical history.    Constitutional: Remarkable for no fever or chills  Cardiovascular: Remarkable for no chest pain or shortness of breath  GI: Remarkable for no nausea vomiting or diarrhea  : Remarkable for no burning in urination frequency urgency  Musculoskeletal: Remarkable for discomfort in the left lower thigh and leg as detailed above in the history of presenting illness.  Neurological: Remarkable for no loss of consciousness or continence.  Patient does not recall any fall or injury.    Vitals:   BP 98/58 (BP Location: Left arm, Patient Position: Lying)   Pulse 83   Temp 98.4 °F (36.9 °C) (Oral)   Resp 18   SpO2 93%   I/O: No intake or output data in the 24 hours ending 12/14/19 4976  Exam:  General Appearance:    Alert, cooperative, no distress, appears stated age   Head:    Normocephalic, without obvious abnormality, atraumatic   Eyes:    PERRL, conjunctiva/corneas clear, EOM's intact, both eyes   Ears:    Normal external ear canals, both ears   Nose:   Nares normal, septum midline,  mucosa normal, no drainage    or sinus tenderness   Throat:   Lips, tongue, gums normal; oral mucosa pink and moist   Neck:   Supple, symmetrical, trachea midline, no adenopathy;     thyroid:  no enlargement/tenderness/nodules; no carotid    bruit or JVD   Back:     Symmetric, no curvature, ROM normal, no CVA tenderness   Lungs:     Clear to auscultation bilaterally, respirations unlabored   Chest Wall:    No tenderness or deformity    Heart:    Regular rate and rhythm, S1 and S2 normal, no murmur, rub   or gallop   Abdomen:     Soft, non-tender, bowel sounds active all four quadrants,    Extremities:  Left thigh and lower extremity is immobilized in a splint and cast wrapped.  Her toe movements and sensation in the left foot is intact.  Capillary refill is intact.   Pulses:   Pulses palpable in all extremities; symmetric all extremities   Skin:   Skin color normal, Skin is warm and dry,  no rashes or palpable lesions   Neurologic:  Grossly nonfocal       Data Review:      I reviewed the patient's new clinical results.    No official report of the x-rays performed at outside facility available patient has a disc that arrived with has as a part of the scanned medical records but I could not upload the disc.        Assessment:  Active Hospital Problems    Diagnosis POA   • Periprosthetic fracture of shaft of femur [M97.8XXA, Z96.649] Unknown       Medical decision making:  Left distal thigh pain of fairly sudden onset without any specific fall or injury in the context of recent hip fracture revision with reported x-ray showing left femoral shaft periprosthetic fracture.  Plan is to admit the patient control her pain and get orthopedic surgery evaluation.  Get basic set of labs including CBC and CMP in the morning.  Generalized osteoarthritis-continue with symptomatic management.      Cheryl Denny MD   12/14/2019  10:45 PM  Much of this encounter note is an electronic transcription/translation of spoken language to  printed text. The electronic translation of spoken language may permit erroneous, or at times, nonsensical words or phrases to be inadvertently transcribed; Although I have reviewed the note for such errors, some may still exist

## 2019-12-15 NOTE — PLAN OF CARE
Problem: Patient Care Overview  Goal: Plan of Care Review  Outcome: Ongoing (interventions implemented as appropriate)  Flowsheets (Taken 12/15/2019 1802)  Progress: improving  Plan of Care Reviewed With: patient  Outcome Summary: pt in surgery currently for ORIF revision of left femur, has been NPO since DEISY ALEJANDRE. NVI, was in a splint when she went down for sx

## 2019-12-15 NOTE — ANESTHESIA PROCEDURE NOTES
Airway  Urgency: elective    Date/Time: 12/15/2019 6:01 PM  End Time:12/15/2019 6:02 PM  Airway not difficult    General Information and Staff    Patient location during procedure: OR  Anesthesiologist: Gerardo Delgado MD    Indications and Patient Condition  Indications for airway management: airway protection    Preoxygenated: yes  MILS maintained throughout  Mask difficulty assessment: 1 - vent by mask    Final Airway Details  Final airway type: endotracheal airway      Successful airway: ETT  Cuffed: yes   Successful intubation technique: direct laryngoscopy  Facilitating devices/methods: cricoid pressure  Endotracheal tube insertion site: oral  Blade: Ml  Blade size: 3  ETT size (mm): 7.0  Cormack-Lehane Classification: grade IIa - partial view of glottis  Placement verified by: chest auscultation and capnometry   Inital cuff pressure (cm H2O): 5  Cuff volume (mL): 5  Measured from: gums  ETT/EBT to gums (cm): 22  Number of attempts at approach: 1

## 2019-12-16 LAB
ANION GAP SERPL CALCULATED.3IONS-SCNC: 14.3 MMOL/L (ref 5–15)
BUN BLD-MCNC: 13 MG/DL (ref 8–23)
BUN/CREAT SERPL: 24.5 (ref 7–25)
CALCIUM SPEC-SCNC: 8.7 MG/DL (ref 8.6–10.5)
CHLORIDE SERPL-SCNC: 101 MMOL/L (ref 98–107)
CO2 SERPL-SCNC: 23.7 MMOL/L (ref 22–29)
CREAT BLD-MCNC: 0.53 MG/DL (ref 0.57–1)
GFR SERPL CREATININE-BSD FRML MDRD: 115 ML/MIN/1.73
GLUCOSE BLD-MCNC: 113 MG/DL (ref 65–99)
HCT VFR BLD AUTO: 32.5 % (ref 34–46.6)
HGB BLD-MCNC: 11.2 G/DL (ref 12–15.9)
POTASSIUM BLD-SCNC: 4.2 MMOL/L (ref 3.5–5.2)
SODIUM BLD-SCNC: 139 MMOL/L (ref 136–145)

## 2019-12-16 PROCEDURE — 25010000002 HYDROMORPHONE PER 4 MG: Performed by: ORTHOPAEDIC SURGERY

## 2019-12-16 PROCEDURE — 97530 THERAPEUTIC ACTIVITIES: CPT

## 2019-12-16 PROCEDURE — 80048 BASIC METABOLIC PNL TOTAL CA: CPT | Performed by: ORTHOPAEDIC SURGERY

## 2019-12-16 PROCEDURE — 25010000002 ONDANSETRON PER 1 MG: Performed by: ORTHOPAEDIC SURGERY

## 2019-12-16 PROCEDURE — 25010000002 ENOXAPARIN PER 10 MG: Performed by: ORTHOPAEDIC SURGERY

## 2019-12-16 PROCEDURE — 85014 HEMATOCRIT: CPT | Performed by: ORTHOPAEDIC SURGERY

## 2019-12-16 PROCEDURE — 85018 HEMOGLOBIN: CPT | Performed by: ORTHOPAEDIC SURGERY

## 2019-12-16 PROCEDURE — 25010000003 CEFAZOLIN IN DEXTROSE 2-4 GM/100ML-% SOLUTION: Performed by: ORTHOPAEDIC SURGERY

## 2019-12-16 PROCEDURE — 97161 PT EVAL LOW COMPLEX 20 MIN: CPT

## 2019-12-16 RX ORDER — OXYCODONE AND ACETAMINOPHEN 10; 325 MG/1; MG/1
2 TABLET ORAL EVERY 4 HOURS PRN
Status: DISCONTINUED | OUTPATIENT
Start: 2019-12-16 | End: 2019-12-17 | Stop reason: HOSPADM

## 2019-12-16 RX ADMIN — CEFAZOLIN SODIUM 2 G: 2 INJECTION, SOLUTION INTRAVENOUS at 09:39

## 2019-12-16 RX ADMIN — HYDROMORPHONE HYDROCHLORIDE 0.5 MG: 1 INJECTION, SOLUTION INTRAMUSCULAR; INTRAVENOUS; SUBCUTANEOUS at 01:06

## 2019-12-16 RX ADMIN — SENNOSIDES AND DOCUSATE SODIUM 2 TABLET: 8.6; 5 TABLET ORAL at 09:38

## 2019-12-16 RX ADMIN — CEFAZOLIN SODIUM 2 G: 2 INJECTION, SOLUTION INTRAVENOUS at 01:05

## 2019-12-16 RX ADMIN — FAMOTIDINE 40 MG: 20 TABLET, FILM COATED ORAL at 09:38

## 2019-12-16 RX ADMIN — ENOXAPARIN SODIUM 40 MG: 40 INJECTION SUBCUTANEOUS at 09:39

## 2019-12-16 RX ADMIN — OXYCODONE HYDROCHLORIDE AND ACETAMINOPHEN 1 TABLET: 10; 325 TABLET ORAL at 15:27

## 2019-12-16 RX ADMIN — OXYCODONE HYDROCHLORIDE AND ACETAMINOPHEN 1 TABLET: 10; 325 TABLET ORAL at 11:28

## 2019-12-16 RX ADMIN — OXYCODONE HYDROCHLORIDE AND ACETAMINOPHEN 1 TABLET: 10; 325 TABLET ORAL at 03:19

## 2019-12-16 RX ADMIN — SENNOSIDES AND DOCUSATE SODIUM 2 TABLET: 8.6; 5 TABLET ORAL at 01:06

## 2019-12-16 RX ADMIN — SENNOSIDES AND DOCUSATE SODIUM 2 TABLET: 8.6; 5 TABLET ORAL at 20:00

## 2019-12-16 RX ADMIN — OXYCODONE HYDROCHLORIDE AND ACETAMINOPHEN 2 TABLET: 10; 325 TABLET ORAL at 23:53

## 2019-12-16 RX ADMIN — SODIUM CHLORIDE, POTASSIUM CHLORIDE, SODIUM LACTATE AND CALCIUM CHLORIDE 100 ML/HR: 600; 310; 30; 20 INJECTION, SOLUTION INTRAVENOUS at 06:30

## 2019-12-16 RX ADMIN — ONDANSETRON 4 MG: 2 INJECTION INTRAMUSCULAR; INTRAVENOUS at 15:58

## 2019-12-16 RX ADMIN — OXYCODONE HYDROCHLORIDE AND ACETAMINOPHEN 1 TABLET: 10; 325 TABLET ORAL at 07:40

## 2019-12-16 RX ADMIN — OXYCODONE HYDROCHLORIDE AND ACETAMINOPHEN 2 TABLET: 10; 325 TABLET ORAL at 19:53

## 2019-12-16 RX ADMIN — HYDROMORPHONE HYDROCHLORIDE 0.5 MG: 1 INJECTION, SOLUTION INTRAMUSCULAR; INTRAVENOUS; SUBCUTANEOUS at 09:38

## 2019-12-16 NOTE — DISCHARGE PLACEMENT REQUEST
"Tammy Godinez (69 y.o. Female)     Date of Birth Social Security Number Address Home Phone MRN    1950  [address] [facility] [address] [phone] [id number]    Druze Marital Status          None        Admission Date Admission Type Admitting Provider Attending Provider Department, Room/Bed    12/14/19 Urgent Cheryl Denny MD Masden, Troy Andrew, MD [address], P799/1    Discharge Date Discharge Disposition Discharge Destination                       Attending Provider:  Uche Brink MD    Allergies:  No Known Allergies    Isolation:  None   Infection:  None   Code Status:  CPR    Ht:  157.5 cm (62\")   Wt:  70.5 kg (155 lb 6.8 oz)    Admission Cmt:  None   Principal Problem:  Periprosthetic fracture of shaft of femur [M97.8XXA,Z96.649]                 Active Insurance as of 12/14/2019     Primary Coverage     Payor Plan Insurance Group Employer/Plan Group    MEDICARE MEDICARE A & B      Payor Plan Address Payor Plan Phone Number Payor Plan Fax Number Effective Dates    [address] [phone]  5/1/2009 - None Entered    [facility] [id number]       Subscriber Name Subscriber Birth Date Member ID       TAMMY GODINEZ 1950 [id number]           Secondary Coverage     Payor Plan Insurance Group Employer/Plan Group    HUMANA HUMANA [facility]              [id number]     Payor Plan Address Payor Plan Phone Number Payor Plan Fax Number Effective Dates    [address] [address]   12/1/2016 - None Entered    [facility] [id number]       Subscriber Name Subscriber Birth Date Member ID       TAMMY GODINEZ 1950 [id number]                 Emergency Contacts      (Rel.) Home Phone Work Phone Mobile Phone    ANTHONY GODINEZ (Spouse) [phone] -- --    GretchenJamir (Son) -- -- [phone]              "

## 2019-12-16 NOTE — THERAPY EVALUATION
Patient Name: Susie Godinez  : 1950    MRN: [id number]                              Today's Date: 2019       Admit Date: 2019    Visit Dx: No diagnosis found.  Patient Active Problem List   Diagnosis   • Periprosthetic fracture of shaft of femur   • Left leg pain     History reviewed. No pertinent past medical history.  History reviewed. No pertinent surgical history.  General Information     Row Name 1928          PT Evaluation Time/Intention    Document Type  evaluation  -AE     Mode of Treatment  physical therapy  -AE     Row Name 1928          General Information    Patient Profile Reviewed?  yes  -AE     Prior Level of Function  independent:  -AE     Existing Precautions/Restrictions  non-weight bearing  -AE     Row Name 1928          Relationship/Environment    Lives With  spouse  -AE     Row Name 1928          Resource/Environmental Concerns    Current Living Arrangements  home/apartment/condo  -AE     Row Name 1928          Home Main Entrance    Number of Stairs, Main Entrance  six  -AE     Stair Railings, Main Entrance  railings safe and in good condition;railings on both sides of stairs  -AE     Row Name 1928          Stairs Within Home, Primary    Stairs, Within Home, Primary  pt does not need basement access immediately upon DC  -AE     Row Name 1928          Cognitive Assessment/Intervention- PT/OT    Orientation Status (Cognition)  oriented x 3  -AE     Row Name 1928          Safety Issues, Functional Mobility    Safety Issues Affecting Function (Mobility)  insight into deficits/self awareness  -AE     Impairments Affecting Function (Mobility)  pain;endurance/activity tolerance;strength;range of motion (ROM)  -AE       User Key  (r) = Recorded By, (t) = Taken By, (c) = Cosigned By    Initials Name Provider Type    AE Mary Jane Mei, PT Physical Therapist        Mobility     Row Name 19 0929           Bed Mobility Assessment/Treatment    Comment (Bed Mobility)  pt up in chair  -AE     Row Name 12/16/19 0929          Transfer Assessment/Treatment    Comment (Transfers)  pt is CGA with all transfers and able to maintain NWB LLE  -AE     Row Name 12/16/19 0929          Bed-Chair Transfer    Bed-Chair [place] (Transfers)  contact guard  -AE     Assistive Device (Bed-Chair Transfers)  walker, front-wheeled  -AE     Row Name 12/16/19 0929          Sit-Stand Transfer    Sit-Stand [place] (Transfers)  contact guard  -AE     Assistive Device (Sit-Stand Transfers)  walker, front-wheeled  -AE     Row Name 12/16/19 0929          Gait/Stairs Assessment/Training    Gait/Stairs Assessment/Training  gait/ambulation independence;gait/ambulation assistive device  -AE     [place] Level (Gait)  contact guard  -AE     Assistive Device (Gait)  walker, front-wheeled  -AE     Distance in Feet (Gait)  15 ft  -AE     Pattern (Gait)  swing-to  -AE     Deviations/Abnormal Patterns (Gait)  antalgic  -AE     Number of Steps (Stairs)  did not attempt due to pain  -AE     Row Name 12/16/19 0929          Mobility Assessment/Intervention    Extremity Weight-bearing Status  left lower extremity  -AE     Left Lower Extremity (Weight-bearing Status)  (S) non weight-bearing (NWB)  -AE       User Key  (r) = Recorded By, (t) = Taken By, (c) = Cosigned By    Initials Name Provider Type    AE Mary Jane Mei PT Physical Therapist        Obj/Interventions     Row Name 12/16/19 0931          General ROM    GENERAL ROM COMMENTS  LLE limited <50% due to pain  -AE     Row Name 12/16/19 0931          MMT (Manual Muscle Testing)    General MMT Comments  generalized weakness  -AE       User Key  (r) = Recorded By, (t) = Taken By, (c) = Cosigned By    Initials Name Provider Type    AE Mary Jane Mei PT Physical Therapist        Goals/Plan     Row Name 12/16/19 0932          Bed Mobility Goal 1 (PT)    Activity/Assistive Device (Bed Mobility  Goal 1, PT)  bed mobility activities, all  -AE     [place] Level/Cues Needed (Bed Mobility Goal 1, PT)  minimum assist (75% or more patient effort)  -AE     Time Frame (Bed Mobility Goal 1, PT)  1 week  -AE     Progress/Outcomes (Bed Mobility Goal 1, PT)  continuing progress toward goal  -AE     Row Name 12/16/19 0932          Transfer Goal 1 (PT)    Activity/Assistive Device (Transfer Goal 1, PT)  transfers, all;walker, rolling  -AE     [place] Level/Cues Needed (Transfer Goal 1, PT)  standby assist  -AE     Time Frame (Transfer Goal 1, PT)  1 week  -AE     Progress/Outcome (Transfer Goal 1, PT)  continuing progress toward goal  -AE     Row Name 12/16/19 0932          Gait Training Goal 1 (PT)    Activity/Assistive Device (Gait Training Goal 1, PT)  gait (walking locomotion);assistive device use  -AE     [place] Level (Gait Training Goal 1, PT)  contact guard assist  -AE     Distance (Gait Goal 1, PT)  40 ft  -AE     Time Frame (Gait Training Goal 1, PT)  1 week  -AE     Progress/Outcome (Gait Training Goal 1, PT)  continuing progress toward goal  -AE     Row Name 12/16/19 0932          Stairs Goal 1 (PT)    Activity/Assistive Device (Stairs Goal 1, PT)  ascending stairs  -AE     [place] Level/Cues Needed (Stairs Goal 1, PT)  minimum assist (75% or more patient effort);1 person assist  -AE     Number of Stairs (Stairs Goal 1, PT)  6 steps ascending via scooting on bottom  -AE     Progress/Outcome (Stairs Goal 1, PT)  continuing progress toward goal  -AE       User Key  (r) = Recorded By, (t) = Taken By, (c) = Cosigned By    Initials Name Provider Type    AE Mary Jane Mei, PT Physical Therapist        Clinical Impression     Row Name 12/16/19 0931          Pain Assessment    Additional Documentation  Pain Scale: Numbers Pre/Post-Treatment (Group)  -AE     Row Name 12/16/19 0931          Pain Scale: Numbers Pre/Post-Treatment    Pain Scale: Numbers, Pretreatment  3/10  -AE     Pain Scale:  Numbers, Post-Treatment  10/10  -AE     Pain Location - Side  Left  -AE     Pain Location - Orientation  incisional  -AE     Pain Location  extremity  -AE     Pain Intervention(s)  Medication (See MAR);Repositioned;Ambulation/increased activity;Elevated;Rest  -AE     Row Name 12/16/19 0931          Plan of Care Review    Plan of Care Reviewed With  spouse  -AE     Outcome Summary  pt wants to DC home with HHPT to follow  -AE     Row Name 12/16/19 0931          Physical Therapy Clinical Impression    Criteria for Skilled Interventions Met (PT Clinical Impression)  yes;treatment indicated  -AE     Rehab Potential (PT Clinical Summary)  good, to achieve stated therapy goals  -AE     Row Name 12/16/19 0931          Positioning and Restraints    Pre-Treatment Position  sitting in chair/recliner  -AE     Post Treatment Position  chair  -AE     In Chair  reclined;call light within reach;encouraged to call for assist;exit alarm on;with family/caregiver  -AE       User Key  (r) = Recorded By, (t) = Taken By, (c) = Cosigned By    Initials Name Provider Type    Mary Jane Larios, OSCAR Physical Therapist        Outcome Measures     Row Name 12/16/19 0934          How much help from another person do you currently need...    Turning from your back to your side while in flat bed without using bedrails?  3  -AE     Moving from lying on back to sitting on the side of a flat bed without bedrails?  3  -AE     Moving to and from a bed to a chair (including a wheelchair)?  3  -AE     Standing up from a chair using your arms (e.g., wheelchair, bedside chair)?  3  -AE     Climbing 3-5 steps with a railing?  1  -AE     To walk in hospital room?  3  -AE     AM-PAC 6 Clicks Score (PT)  16  -AE     Row Name 12/16/19 0934          Functional Assessment    Outcome Measure Options  AM-PAC 6 Clicks Basic Mobility (PT)  -AE       User Key  (r) = Recorded By, (t) = Taken By, (c) = Cosigned By    Initials Name Provider Type    SAMUEL Mei  OSCAR [place] Physical Therapist          PT Recommendation and Plan  Planned Therapy Interventions (PT Eval): balance training, bed mobility training, gait training, neuromuscular re-education, home exercise program, stretching, strengthening, stair training, ROM (range of motion), transfer training, postural re-education  Outcome Summary/Treatment Plan (PT)  Anticipated Discharge Disposition (PT): home, home with assist, home with home health  Plan of Care Reviewed With: spouse  Outcome Summary: pt wants to DC home with HHPT to follow     Time Calculation:   PT Charges     Row Name 12/16/19 0938             Time Calculation    Start Time  0840  -AE      Stop Time  0852  -AE      Time Calculation (min)  12 min  -AE      PT Received On  12/16/19  -AE      PT - Next Appointment  12/17/19  -AE      PT Goal Re-Cert Due Date  12/23/19  -AE         Time Calculation- PT    Total Timed Code Minutes- PT  12 minute(s)  -AE        User Key  (r) = Recorded By, (t) = Taken By, (c) = Cosigned By    Initials Name Provider Type    AE Mary Jane Mei PT Physical Therapist        Therapy Charges for Today     Code Description Service Date Service Provider Modifiers Qty    06170397995 HC PT EVAL LOW COMPLEXITY 2 12/16/2019 Mary Jane Mei, PT GP 1    98660157018 HC PT THERAPEUTIC ACT EA 15 MIN 12/16/2019 Mary Jane Mei, PT GP 1          PT G-Codes  Outcome Measure Options: AM-PAC 6 Clicks Basic Mobility (PT)  AM-PAC 6 Clicks Score (PT): 16    Mary Jane Mei PT  12/16/2019

## 2019-12-16 NOTE — PLAN OF CARE
Problem: Patient Care Overview  Goal: Plan of Care Review  Outcome: Ongoing (interventions implemented as appropriate)  Note:   Pt is a 70 yo F s/p revision ORIF periprosthetic femur fx with cortical strut graft. Pt reports PLOF as independent; did not use AD for mobility however owns all necessary DME for home DC. Pt has 6 steps to enter home with B rails (too wide to have simultaneous BUE support) with basement that she does not have to immediately access upon DC. Pt presents with severe 10/10 LLE pain with all mobility, NWB status in LLE significantly impacting gait, and generalized weakness. Pt was CGA for all transfers and gait up to 15 ft with FWW limited only due to pain. Pt has ascended steps previously while NWB with pt scooting up steps on bottom and having /son there to help her up into standing with FWW. Pt safe and appropriate for home DC with HHPT to follow pending stair training. Skilled PT needed to address above impairments.

## 2019-12-16 NOTE — PROGRESS NOTES
Discharge Planning Assessment  [facility]     Patient Name: Susie Godinez  MRN: [id number]  Today's Date: 12/16/2019    Admit Date: 12/14/2019    Discharge Needs Assessment     Row Name 12/16/19 1630       Living Environment    Lives With  spouse    Name(s) of Who Lives With Patient  Cheo    Current Living Arrangements  home/apartment/condo    Primary Care Provided by  self    Provides Primary Care For  no one    Family Caregiver if Needed  none    Quality of Family Relationships  helpful;involved;supportive       Resource/Environmental Concerns    Resource/Environmental Concerns  home accessibility    Home Accessibility Concerns  stairs to enter home       Transition Planning    Patient/Family Anticipates Transition to  home with family;inpatient rehabilitation facility    Patient/Family Anticipated Services at Transition  ;rehabilitation services    Transportation Anticipated  family or friend will provide       Discharge Needs Assessment    Readmission Within the Last 30 Days  no previous admission in last 30 days    Concerns to be Addressed  basic needs;care coordination/care conferences;discharge planning    Equipment Currently Used at Home  cane, straight;wheelchair    Anticipated Changes Related to Illness  inability to care for self    Equipment Needed After Discharge  none    Provided post acute provider list?  Yes    Post Acute Provider Lists  Home Health    Post Acuite Provider List  Delivered    Delivered To  Patient    Method of Delivery  In person    Patient's Choice of Community Agency(s)  [facility]        Discharge Plan     Row Name 12/16/19 1624       Plan    Plan  return home with [facility]     Patient/Family in Agreement with Plan  yes    Plan Comments  Spoke with patient at bedside.  Facesheet, PCP, andf pharmacy verified.  Patient lives in a multi level home with , Cheo ( [phone]).  She states that she will be able to stay on the main floor at [place].  She  has a cane and walker at home.  She has used [facility]  in the past and would like to have them follow again.  Called [facility] ( [phone], fax- [phone]) and spoke with Melissa.  She anticipates they will be able to accept.  Clinicals faxed to [facility] @ [phone].  CCP will follow.  Solange Aguiar RN        Destination      Coordination has not been started for this encounter.      Durable Medical Equipment      Coordination has not been started for this encounter.      Dialysis/Infusion      Coordination has not been started for this encounter.      Home Medical Care      Service Provider Request Status Selected Services Address Phone Number Fax Number    [facility] Pending - Request Sent N/A [address] [facility] [id number] [phone]      Therapy      Coordination has not been started for this encounter.      Community Resources      Coordination has not been started for this encounter.          Demographic Summary     Row Name 12/16/19 1630       General Information    Admission Type  inpatient    Arrived From  emergency department    Required Notices Provided  Important Message from Medicare    Referral Source  admission list    Reason for Consult  discharge planning    Preferred Language  English        Functional Status     Row Name 12/16/19 1630       Functional Status    Usual Activity Tolerance  good    Current Activity Tolerance  moderate       Functional Status, IADL    Medications  independent    Meal Preparation  independent    Housekeeping  independent    Laundry  independent    Shopping  independent       Mental Status    General Appearance WDL  WDL       Mental Status Summary    Recent Changes in Mental Status/Cognitive Functioning  no changes        Psychosocial    No documentation.       Abuse/Neglect    No documentation.       Legal    No documentation.       Substance Abuse    No documentation.       Patient Forms    No  documentation.           Solange Aguiar RN

## 2019-12-16 NOTE — ANESTHESIA POSTPROCEDURE EVALUATION
Patient: Susie Godinez    Procedure Summary     Date:  12/15/19 Room / Location:  [facility] OR [address] [place] [address] MAIN OR    Anesthesia Start:  1750 Anesthesia Stop:  2045    Procedure:  revision eleazar-prosthetic left femur with cortical graft (Left Thigh) Diagnosis:      Surgeon:  Kenny García MD Provider:  Gerardo Delgado MD    Anesthesia Type:  general ASA Status:  2          Anesthesia Type: general    Vitals  Vitals Value Taken Time   /85 12/15/2019 10:00 PM   Temp 36.6 °C (97.9 °F) 12/15/2019  8:48 PM   Pulse 84 12/15/2019 10:15 PM   Resp 18 12/15/2019  8:48 PM   SpO2 97 % 12/15/2019 10:15 PM   Vitals shown include unvalidated device data.        Post Anesthesia Care and Evaluation    Patient location during evaluation: PACU  Patient participation: complete - patient participated  Level of consciousness: awake  Pain score: 3  Pain management: adequate  Airway patency: patent  Anesthetic complications: No anesthetic complications  PONV Status: none  Cardiovascular status: acceptable  Respiratory status: acceptable  Hydration status: acceptable

## 2019-12-16 NOTE — OP NOTE
FEMUR OPEN REDUCTION INTERNAL FIXATION  Procedure Note    Susie Godinez  12/15/2019    Pre-op Diagnosis: Left Periprosthetic Femur Fracture, Failed ORIF with retained broken hardware  Post-op Diagnosis:Same  Procedure:   Revision Open Reduction Internal Fixation Left Distal Femur with cortical strut grafting and removal of hardware  Surgeon:  Kenny García MD  Anesthesia: General, Anesthesiologist: Gerardo Delgado MD  Staff: Circulator: Liliana Lehman RN; Maikol Clifford RN  Radiology Technologist: Ingrid Martines RRT; Erinn Montana  Scrub Person: Kaylin Jimenez  Assistant: Trent Arcos Sr., MD CFA  Estimated Blood Loss:300 mL  Specimens:* No orders in the log *   Drains: one  Complications: None    Components Utilized:     Implant Name Type Inv. Item Serial No.  Lot No. LRB No. Used   CORTICAL STRUT QUARTER Implant  02/22/2020 Immunovaccine N/A Left 1   CORTICAL/CANCELLOUS CHIPS MIX 20/80 Implant  [id number]   Left 1   CABL CERCLG GTR COCR 1.8MM 63.5CM - [id number] Implant CABL CERCLG GTR COCR 1.8MM 63.5CM  [employer] [id number] Left 2   CABL CERCLG GTR COCR 1.8MM 63.5CM - [id number] Implant CABL CERCLG GTR COCR 1.8MM 63.5CM  [employer] [id number] Left 1   SCRW MADDI NCB S/TAP 5X34MM STRL - [id number] Implant SCRW MADDI NCB S/TAP 5X34MM STRL  [employer] [id number] Left 1   SCRW MADDI NCB S/TAP 5X34MM STRL - [id number] Implant SCRW MADDI NCB S/TAP 5X34MM STRL  [employer] [id number] Left 1   CABL CERCLG GTR COCR 1.8MM 63.5CM - [id number] Implant CABL CERCLG GTR COCR 1.8MM 63.5CM  [employer] [id number] Left 1   CABL CERCLG GTR COCR 1.8MM 63.5CM - [id number] Implant CABL CERCLG GTR COCR 1.8MM 63.5CM  [employer] [id number] Left 1   CABL CERCLG GTR COCR 1.8MM 63.5CM - [id number] Implant CABL CERCLG GTR COCR 1.8MM 63.5CM  [employer] [id number] Left 1   CABL CERCLG GTR COCR 1.8MM 63.5CM - [id number] Implant CABL CERCLG GTR COCR 1.8MM 63.5CM  [employer] [id number] Left 1        Indication for Procedure:   The patient is a 69 y.o. female presents today with a history of left total hip arthroplasty that sustained a eleazar-prosthetic femur fracture.  She was treated by my partner Dr. Edy Roy back in Aug 2019.  She did well initially and had returned to normal activity until 1 day ago had severe worsening pain.  She presented to an [facility] hospital and was found to have broken the plate.  She was transferred to [facility] for further workup and treatment. The patient was educated in risks of surgery that could include possible risk of infection, deep venous thrombosis, pulmonary embolism, fracture, neurovascular injury, leg length discrepancy, fracture malunion, nonunion, dislocation, possible persistent pain, need for additional surgeries, anesthetic risks, medical risks including heart attack and stroke, and death.   The patient had the opportunity to ask questions, and concerns about the proposed surgery.  The patient also understood that medicine is not an exact science, and that outcomes of the surgical procedure may be less than desired. The patient wished to proceed.      Protocols for intravenous antibiotics and venous thrombosis were followed for this patient.  IV antibiotics were infused prior to surgery and will be discontinued within 24 hours of completion of the surgical procedure.  Thrombosis prophylaxis will be initiated within 24 hours of the completion of the surgical procedure.      Procedure:   After the patient was identified in the preoperative area, and the surgical site confirmed and marked, the patient was brought to the operating room on a stretcher. The above anesthesia was placed uneventfully.  They were placed in a lateral decubitus position with the operative side up, an axially roll was placed and she was secured to the operating room table with a bean bag.  A time-out procedure was performed.  The intravenous ancef antibiotics infusion was  completed.  The operative thigh, and was prepped and draped in the usual sterile fashion.    A 10 blade scalpel was used to make a longitudinal incision along the old incision line from the lateral side of the distal femur to proximal to the greater trochanter of the hip.    The iliotibial band was then split longitudinally and the vastus lateralis was mobilized off the posterior intermuscular septum exposing the femur laterally.  The broken hardware and fracture was encountered.  The plate was removed.  Then a cortical strut graft was selected and placed on the medial side of the fracture.  Then a 15 hole matilda eleazar-prosthetic plate was applied to the lateral aspect of the femur.  Matilda Cables were placed holding the cortical strut graft in place.  Then the screws were filled in standard AO fashion.  Locking caps were placed.   A total of 7 cables were placed and re-tensioned.  Final C-arm images were obtained which showed the fracture to be in anatomical alignment and hardware in good position.  The wound was copiously irrigated with the pulse lavage and a drain was placed exiting out distal laterally.  Crushed cancellous bone graft was also placed along the fracture line anteriorly.  The wound was then closed with #1 strata-fix closing the IT band in a running fashion. #1stratafix was used to close the deep dermis.  And staples were used to close the skin.  Sterile dressings were applied.  Sponge and needle counts were completed and were correct.  The patient was awaken from anesthetic and was returned to recovery in stable condition.  There were no intra-operative complications.    Kenny García MD     Date: 12/15/2019  Time: 8:37 PM

## 2019-12-16 NOTE — PROGRESS NOTES
Procedure(s):  revision eleazar-prosthetic left femur with cortical graft     LOS: 2 days     Subjective :   Complains of pain not well controlled with meds.    Objective :    Vital signs in last 24 hours:  Vitals:    12/15/19 2234 12/15/19 2335 12/16/19 0311 12/16/19 0700   BP: 174/86 155/84 126/62 142/71   BP Location: Right arm Right arm Left arm Right arm   Patient Position: Lying Lying Lying Sitting   Pulse: 82 80 72 71   Resp:  18 16 16   Temp: 98 °F (36.7 °C) 97 °F (36.1 °C) 98.6 °F (37 °C) 97.3 °F (36.3 °C)   TempSrc:   Oral Oral   SpO2:  97% 98% 97%   Weight:       Height:           PHYSICAL EXAM:  Patient is calm, in no acute distress, awake and oriented x 3.  Dressing is clean, dry and intact.  No signs of infection.  Swelling is appropriate in amount.  Ecchymosis is appropriate in amount.  Homans test is negative.  Patient is neurovascularly intact distally.    LABS:  Results from last 7 days   Lab Units 12/16/19  0412 12/15/19  0553   WBC 10*3/mm3  --  7.17   HEMOGLOBIN g/dL 11.2* 11.4*   HEMATOCRIT % 32.5* 34.8   PLATELETS 10*3/mm3  --  309     Results from last 7 days   Lab Units 12/16/19  0412   SODIUM mmol/L 139   POTASSIUM mmol/L 4.2   CHLORIDE mmol/L 101   CO2 mmol/L 23.7   BUN mg/dL 13   CREATININE mg/dL 0.53*   GLUCOSE mg/dL 113*   CALCIUM mg/dL 8.7             ASSESSMENT:  Status post Procedure(s):  revision eleazar-prosthetic left femur with cortical graft      Plan:  Continue Physical Therapy, increase mobility and range of motion as tolerated.  Continue SCDs, Continue DVT prophylaxis.  Aspirin 325 mg BID after discharge.  Dispo planning for rehab Tuesday/Wednesday.    Kenny García MD    Date: 12/16/2019  Time: 7:30 AM

## 2019-12-16 NOTE — PROGRESS NOTES
"    DAILY PROGRESS NOTE  [facility]    Patient Identification:  Name: Susie Godinez  Age: 69 y.o.  Sex: female  :  1950  MRN: [id number]         Primary Care Physician: Fabi Dhillon MD    Subjective:  Interval History: No new issues today.  Denies chest pain shortness of breath.  She did have some postoperative nausea which is resolved and never brought her to emesis.  Pain control is adequate    Objective: Spouse at bedside.    Scheduled Meds:  enoxaparin 40 mg Subcutaneous Daily   famotidine 40 mg Oral Daily   senna-docusate sodium 2 tablet Oral BID   sodium chloride 10 mL Intravenous Q12H   sodium chloride 3 mL Intravenous Q12H     Continuous Infusions:  lactated ringers 100 mL/hr Last Rate: Stopped (19 0939)       Vital signs in last 24 hours:  Temp:  [97 °F (36.1 °C)-99.8 °F (37.7 °C)] 97.1 °F (36.2 °C)  Heart Rate:  [71-84] 76  Resp:  [16-18] 16  BP: (124-175)/() 142/71    Intake/Output:    Intake/Output Summary (Last 24 hours) at 2019 1338  Last data filed at 2019 1300  Gross per 24 hour   Intake 3412 ml   Output 1450 ml   Net 1962 ml       Exam:  /71 (BP Location: Right arm, Patient Position: Sitting)   Pulse 76   Temp 97.1 °F (36.2 °C) (Oral)   Resp 16   Ht 157.5 cm (62\")   Wt 70.5 kg (155 lb 6.8 oz)   SpO2 96%   BMI 28.43 kg/m²     General Appearance:    Alert, cooperative, no distress, AAOx3                         Throat:   Oral mucosa pink and moist                           Neck:   No JVD                         Lungs:    Clear to auscultation bilaterally, respirations unlabored                          Heart:    Regular rate and rhythm, S1 and S2 normal                  Abdomen:     Soft, non-tender, bowel sounds active                 Extremities:   No cyanosis or edema                        Pulses:   Pulses palpable in lower extremities                               Data Review:  Labs in chart were " reviewed.    Assessment:  Active Hospital Problems    Diagnosis  POA   • **Periprosthetic fracture of shaft of femur [M97.8XXA, Z96.649]  Yes   • Left leg pain [M79.605]  Yes      Resolved Hospital Problems   No resolved problems to display.       Plan:    POD1 revision of open reduction and internal fixation of left distal femur with cortical strut grafting and removal of hardware   -Acute blood loss anemia with a hemoglobin of 11.2 -monitor   -ASA per orthopedics for DVT prophylaxis post discharge 325 mg   -Continue pain control      Disposition -likely in the next day or 2 -[facility] for subacute rehab/DC needs    Uche Brink MD  12/16/2019  1:38 PM

## 2019-12-16 NOTE — PLAN OF CARE
POD #1, NWB, dressing CDI, drain patent, up in the chair, worked with PT, requested purewick be placed when in bed, pt complained of inadequate pain control with 1 percocet 10/325, MD notified and dose increased to 2 tablets, pt is hopeful that she can DC tomorrow with HH

## 2019-12-16 NOTE — PLAN OF CARE
Problem: Patient Care Overview  Goal: Plan of Care Review  Outcome: Ongoing (interventions implemented as appropriate)  Flowsheets (Taken 12/16/2019 0430)  Progress: improving  Plan of Care Reviewed With: patient; spouse  Outcome Summary: Pt back on floor post op. VSS. Voiding fx is intact, currently using the external catheter. Pain is controlled wtih IV and po meds. Nausea was controlled with iv med. Pt is educated on fall prevention, pulmonary toilet, and pain management. Pt anticipates d/c home when medically stable.

## 2019-12-17 VITALS
SYSTOLIC BLOOD PRESSURE: 102 MMHG | RESPIRATION RATE: 16 BRPM | OXYGEN SATURATION: 93 % | BODY MASS INDEX: 28.6 KG/M2 | HEART RATE: 75 BPM | DIASTOLIC BLOOD PRESSURE: 62 MMHG | HEIGHT: 62 IN | TEMPERATURE: 97.8 F | WEIGHT: 155.42 LBS

## 2019-12-17 LAB
HCT VFR BLD AUTO: 31.1 % (ref 34–46.6)
HGB BLD-MCNC: 10.4 G/DL (ref 12–15.9)

## 2019-12-17 PROCEDURE — 85014 HEMATOCRIT: CPT | Performed by: HOSPITALIST

## 2019-12-17 PROCEDURE — 85018 HEMOGLOBIN: CPT | Performed by: HOSPITALIST

## 2019-12-17 PROCEDURE — 25010000002 ENOXAPARIN PER 10 MG: Performed by: ORTHOPAEDIC SURGERY

## 2019-12-17 PROCEDURE — 97110 THERAPEUTIC EXERCISES: CPT

## 2019-12-17 RX ORDER — OXYCODONE AND ACETAMINOPHEN 10; 325 MG/1; MG/1
1-2 TABLET ORAL EVERY 4 HOURS PRN
Qty: 56 TABLET | Refills: 0 | Status: SHIPPED | OUTPATIENT
Start: 2019-12-17 | End: 2019-12-25

## 2019-12-17 RX ORDER — SENNA AND DOCUSATE SODIUM 50; 8.6 MG/1; MG/1
2 TABLET, FILM COATED ORAL 2 TIMES DAILY
Qty: 60 TABLET | Refills: 1 | Status: SHIPPED | OUTPATIENT
Start: 2019-12-17 | End: 2020-08-07

## 2019-12-17 RX ADMIN — OXYCODONE HYDROCHLORIDE AND ACETAMINOPHEN 2 TABLET: 10; 325 TABLET ORAL at 03:56

## 2019-12-17 RX ADMIN — OXYCODONE HYDROCHLORIDE AND ACETAMINOPHEN 2 TABLET: 10; 325 TABLET ORAL at 11:52

## 2019-12-17 RX ADMIN — APIXABAN 2.5 MG: 2.5 TABLET, FILM COATED ORAL at 09:39

## 2019-12-17 RX ADMIN — OXYCODONE HYDROCHLORIDE AND ACETAMINOPHEN 2 TABLET: 10; 325 TABLET ORAL at 07:38

## 2019-12-17 RX ADMIN — SENNOSIDES AND DOCUSATE SODIUM 2 TABLET: 8.6; 5 TABLET ORAL at 07:34

## 2019-12-17 RX ADMIN — SODIUM CHLORIDE, PRESERVATIVE FREE 10 ML: 5 INJECTION INTRAVENOUS at 07:38

## 2019-12-17 RX ADMIN — FAMOTIDINE 40 MG: 20 TABLET, FILM COATED ORAL at 07:34

## 2019-12-17 NOTE — DISCHARGE SUMMARY
Discharge Summary    Date of Admission: 12/14/2019  7:08 PM  Date of Discharge:  12/17/2019    Discharge Diagnosis:   Periprosthetic fracture of shaft of femur [M97.8XXA, Z96.649]    PMHX:   History reviewed. No pertinent past medical history.    Discharge Disposition  Home-Health Care [facility]    Procedures Performed  Procedure(s):  revision eleazar-prosthetic left femur with cortical graft       Indication for Admission  Patient is a 69 y.o. female admitted after sustaining a fracture of her plate of her femur.  They were admitted for surgical intervention, pain control, medical management and physical therapy.  They progressed with physical therapy.  They were deemed stable for discharge.      Consults:   Consults     Date and Time Order Name Status Description    12/14/2019 2013 Inpatient Orthopedic Surgery Consult Completed           Discharge Instructions:  Patient is non-weight bearing as tolerated on the operative leg.  Patient is to progress range of motion and ambulation as tolerated.  Use walker as needed for stability and gait. The dressing should be changed 3/week and prn saturation.  Patient will follow-up in the office in 2 weeks.  Call the office at [phone] for any questions or concerns.      Discharge Medications     Discharge Medications      New Medications      Instructions Start Date   apixaban 2.5 MG tablet tablet  Commonly known as:  ELIQUIS   2.5 mg, Oral, Every 12 Hours Scheduled      oxyCODONE-acetaminophen  MG per tablet  Commonly known as:  PERCOCET   1-2 tablets, Oral, Every 4 Hours PRN      senna-docusate sodium 8.6-50 MG tablet  Commonly known as:  SENOKOT-S   2 tablets, Oral, 2 Times Daily             Discharge Diet:   Diet Instructions     Advance Diet As Tolerated      Resume your normal diet.          Activity at Discharge:   Activity Instructions     Other Instructions (Specify)      Activity Instructions: nwb operative leg for 6 weeks.          Follow-up Appointments  No  future appointments.  Additional Instructions for the Follow-ups that You Need to Schedule     Discharge Follow-up with Specified Provider: Dr. Tay office.  An appointment has already been made at time of surgery scheduling.  Call office if you need to verify appointment time or date.  [phone]; 2 Weeks   As directed      To:  Dr. Tay office.  An appointment has already been made at time of surgery scheduling.  Call office if you need to verify appointment time or date.  [phone]    Follow Up:  2 Weeks         Referral to Home Health   As directed      Face to Face Visit Date:  12/17/2019    Follow-up provider for Plan of Care?:  I will be treating the patient on an ongoing basis.  Please send me the Plan of Care for signature.    Follow-up provider:  KENNY TAY [[id number]]    Reason/Clinical Findings:  s/p TKA    Describe mobility limitations that make leaving home difficult:  taxing effort    Nursing/Therapeutic Services Requested:  Physical Therapy Skilled Nursing    Skilled nursing orders:  Wound care dressing/changes    Instructions:  change dressing 3x/week.    Frequency:  1 Week 1               Test Results Pending at Discharge  none     Kenny Tay MD  12/17/19,  7:40 AM

## 2019-12-17 NOTE — THERAPY TREATMENT NOTE
Patient Name: Susie Godinez  : 1950    MRN: [id number]                              Today's Date: 2019       Admit Date: 2019    Visit Dx:     ICD-10-CM ICD-9-CM   1. Periprosthetic fracture of shaft of femur M97.8XXA 996.44    Z96.649    2. Left leg pain M79.605 729.5     Patient Active Problem List   Diagnosis   • Periprosthetic fracture of shaft of femur   • Left leg pain     History reviewed. No pertinent past medical history.  History reviewed. No pertinent surgical history.  General Information     Row Name 19 1310          PT Evaluation Time/Intention    Document Type  therapy note (daily note)  -PC     Mode of Treatment  physical therapy  -PC     Row Name 19 1310          Cognitive Assessment/Intervention- PT/OT    Orientation Status (Cognition)  oriented x 4  -PC     Row Name 19 1310          Safety Issues, Functional Mobility    Impairments Affecting Function (Mobility)  pain;endurance/activity tolerance;strength  -PC       User Key  (r) = Recorded By, (t) = Taken By, (c) = Cosigned By    Initials Name Provider Type    PC Maria Luisa Samano PT Physical Therapist        Mobility     Row Name 19 1311          Bed Mobility Assessment/Treatment    Bed Mobility Assessment/Treatment  supine-sit;sit-supine  -PC     Supine-Sit [place] (Bed Mobility)  minimum assist (75% patient effort)  -PC     Sit-Supine [place] (Bed Mobility)  minimum assist (75% patient effort)  -PC     Comment (Bed Mobility)  pt needed assist to bring leg over to edge of bed when getting out of bed, and assist to bring leg back onto bed when getting in bed  -PC     Row Name 19 1311          Sit-Stand Transfer    Sit-Stand [place] (Transfers)  supervision  -PC     Assistive Device (Sit-Stand Transfers)  walker, front-wheeled  -PC     Row Name 19 1311          Gait/Stairs Assessment/Training    [place] Level (Gait)  supervision  -PC     Assistive Device (Gait)  walker,  front-wheeled  -PC     Distance in Feet (Gait)  30 ft  -PC     Pattern (Gait)  step-to  -PC     Deviations/Abnormal Patterns (Gait)  antalgic  -PC     Comment (Gait/Stairs)  pt was able to maintain NWB status, pt has experience going up stairs to get into house, will be going up on her bottom, will have assist to stand  -PC     Row Name 12/17/19 1311          Mobility Assessment/Intervention    Extremity Weight-bearing Status  left lower extremity  -PC     Left Lower Extremity (Weight-bearing Status)  (S) non weight-bearing (NWB)  -PC       User Key  (r) = Recorded By, (t) = Taken By, (c) = Cosigned By    Initials Name Provider Type    PC Maria Luisa Samano, PT Physical Therapist        Obj/Interventions    No documentation.       Goals/Plan    No documentation.       Clinical Impression     Row Name 12/17/19 1315          Pain Assessment    Additional Documentation  Pain Scale: FACES Pre/Post-Treatment (Group)  -PC     Row Name 12/17/19 5246          Pain Scale: Numbers Pre/Post-Treatment    Pain Location - Side  Left  -PC     Pain Location - Orientation  lower  -PC     Pain Location  extremity  -PC     Pain Intervention(s)  Medication (See MAR);Repositioned;Cold applied  -PC     Row Name 12/17/19 5821          Pain Scale: FACES Pre/Post-Treatment    Pain: FACES Scale, Pretreatment  4-->hurts little more  -PC     Pain: FACES Scale, Post-Treatment  4-->hurts little more  -PC     Row Name 12/17/19 0920          Plan of Care Review    Plan of Care Reviewed With  patient  -PC     Progress  improving  -PC     Outcome Summary  pt is able to maintain NWB status with rwx with gait, has shown good progress, and good understanding of importance of NWB, she will be going home today and will have assist of family, has 6 stairs to get to main level and she will go up on her bottom, she has had to do this in the past and is comfortable with this.  -PC     Row Name 12/17/19 9174          Positioning and Restraints    Pre-Treatment  Position  in bed  -PC     Post Treatment Position  bed  -PC     In Bed  supine;call light within reach;encouraged to call for assist  -PC       User Key  (r) = Recorded By, (t) = Taken By, (c) = Cosigned By    Initials Name Provider Type    PC Maria Luisa Samano, PT Physical Therapist        Outcome Measures     Row Name 12/17/19 1318          How much help from another person do you currently need...    Turning from your back to your side while in flat bed without using bedrails?  3  -PC     Moving from lying on back to sitting on the side of a flat bed without bedrails?  3  -PC     Moving to and from a bed to a chair (including a wheelchair)?  3  -PC     Standing up from a chair using your arms (e.g., wheelchair, bedside chair)?  3  -PC     Climbing 3-5 steps with a railing?  2  -PC     To walk in hospital room?  3  -PC     AM-PAC 6 Clicks Score (PT)  17  -PC       User Key  (r) = Recorded By, (t) = Taken By, (c) = Cosigned By    Initials Name Provider Type    Maria Luisa Lees, PT Physical Therapist          PT Recommendation and Plan     Outcome Summary/Treatment Plan (PT)  Anticipated Discharge Disposition (PT): home with assist, home with home health  Plan of Care Reviewed With: patient  Progress: improving  Outcome Summary: pt is able to maintain NWB status with rwx with gait, has shown good progress, and good understanding of importance of NWB, she will be going home today and will have assist of family, has 6 stairs to get to main level and she will go up on her bottom, she has had to do this in the past and is comfortable with this.     Time Calculation:   PT Charges     Row Name 12/17/19 1319             Time Calculation    Start Time  1300  -PC      Stop Time  1315  -PC      Time Calculation (min)  15 min  -PC      PT Received On  12/17/19  -PC        User Key  (r) = Recorded By, (t) = Taken By, (c) = Cosigned By    Initials Name Provider Type    Maria Luisa Lees, PT Physical Therapist        Therapy  Charges for Today     Code Description Service Date Service Provider Modifiers Qty    [id number] HC PT THER PROC EA 15 MIN 12/17/2019 Maria Luisa Samano, PT GP 1          PT G-Codes  Outcome Measure Options: AM-PAC 6 Clicks Basic Mobility (PT)  AM-PAC 6 Clicks Score (PT): 17    Maria Luisa Samano, OSCAR  12/17/2019

## 2019-12-17 NOTE — PROGRESS NOTES
Case Management Discharge Note      Final Note: DC home with [facility] . Solange Aguiar RN    Provided post acute provider list?: Yes  Post Acute Provider Lists: Home Health  Post Acuite Provider List: Delivered  Delivered To: Patient  Method of Delivery: In person    Destination      No service has been selected for the patient.      Durable Medical Equipment      No service has been selected for the patient.      Dialysis/Infusion      No service has been selected for the patient.      Home Medical Care - Selection Complete      Service Provider Request Status Selected Services Address Phone Number Fax Number    [facility] Selected Home Health Services [address] [place] [phone]      Therapy      No service has been selected for the patient.      Community Resources      No service has been selected for the patient.             Final Discharge Disposition Code: 06 - home with home health care

## 2019-12-17 NOTE — PROGRESS NOTES
Continued Stay Note  [facility]     Patient Name: Susie Godinez  MRN: [id number]  Today's Date: 12/17/2019    Admit Date: 12/14/2019    Discharge Plan     Row Name 12/17/19 1042       Plan    Plan Comments  Patient would like a 3 in 1 commode.  Order obtained and faxed to [facility].  Requested that they deliver to the bedside. Solange Aguiar RN        Discharge Codes    No documentation.       Expected Discharge Date and Time     Expected Discharge Date Expected Discharge Time    Dec 17, 2019             Solange Aguiar RN

## 2019-12-17 NOTE — PROGRESS NOTES
Continued Stay Note  [facility]     Patient Name: Susie Godinez  MRN: [id number]  Today's Date: 12/17/2019    Admit Date: 12/14/2019    Discharge Plan     Row Name 12/17/19 1042       Plan    Plan Comments  Patient would like a 3 in 1 commode.  Order obtained and faxed to [facility].  Requested that they deliver to the bedside.  DC summary and HH referral faxed to [facility] and message left at office to notify them of dc. Solange Aguiar RN        Discharge Codes    No documentation.       Expected Discharge Date and Time     Expected Discharge Date Expected Discharge Time    Dec 17, 2019             Solange Augiar RN

## 2019-12-17 NOTE — PLAN OF CARE
Problem: Patient Care Overview  Goal: Plan of Care Review  Outcome: Ongoing (interventions implemented as appropriate)  Flowsheets (Taken 12/17/2019 1314)  Outcome Summary: pt is able to maintain NWB status with rwx with gait, has shown good progress, and good understanding of importance of NWB, she will be going home today and will have assist of family, has 6 stairs to get to main level and she will go up on her bottom, she has had to do this in the past and is comfortable with this.

## 2019-12-17 NOTE — PROGRESS NOTES
Procedure(s):  revision eleazar-prosthetic left femur with cortical graft     LOS: 3 days     Subjective :   Pain controlled with meds.    Objective :    Vital signs in last 24 hours:  Vitals:    12/16/19 1900 12/16/19 2300 12/17/19 0300 12/17/19 0658   BP: 104/66 117/55 102/62 102/62   BP Location: Left arm Left arm Left arm Left arm   Patient Position: Lying Lying Lying Sitting   Pulse: 93 89 80 75   Resp: 16 16 16 16   Temp: 100 °F (37.8 °C) 99.6 °F (37.6 °C) 99 °F (37.2 °C) 97.8 °F (36.6 °C)   TempSrc:    Oral   SpO2: 94% 94% 94% 93%   Weight:       Height:           PHYSICAL EXAM:  Patient is calm, in no acute distress, awake and oriented x 3.  Dressing is clean, dry and intact.  No signs of infection.  Swelling is appropriate in amount.  Ecchymosis is appropriate in amount.  Homans test is negative.  Patient is neurovascularly intact distally.    LABS:  Results from last 7 days   Lab Units 12/17/19  0539  12/15/19  0553   WBC 10*3/mm3  --   --  7.17   HEMOGLOBIN g/dL 10.4*   < > 11.4*   HEMATOCRIT % 31.1*   < > 34.8   PLATELETS 10*3/mm3  --   --  309    < > = values in this interval not displayed.     Results from last 7 days   Lab Units 12/16/19  0412   SODIUM mmol/L 139   POTASSIUM mmol/L 4.2   CHLORIDE mmol/L 101   CO2 mmol/L 23.7   BUN mg/dL 13   CREATININE mg/dL 0.53*   GLUCOSE mg/dL 113*   CALCIUM mg/dL 8.7             ASSESSMENT:  Status post Procedure(s):  revision eleazar-prosthetic left femur with cortical graft      Plan:  Continue Physical Therapy, increase mobility and range of motion as tolerated.  Continue SCDs, Continue DVT prophylaxis.  Eliquis 2.5 mg po bid after discharge.  Dispo planning for home today.  D/c drain.  Change dressing.  Ok to d/c home today.   Follow up in 2 weeks in the office.    Kenny García MD    Date: 12/17/2019  Time: 7:31 AM

## 2019-12-17 NOTE — PLAN OF CARE
Problem: Patient Care Overview  Goal: Plan of Care Review  Outcome: Ongoing (interventions implemented as appropriate)  Flowsheets (Taken 12/17/2019 0328)  Progress: improving  Plan of Care Reviewed With: patient  Outcome Summary: Pt ambulating with walker and assist x1, non-weight bearing on the left leg. VSS. Voiding fx is intact. Currently using purewick through the night. Pain is controlled with po meds. Pt educated on fall prevention and pulmonary toilet.

## 2020-08-07 ENCOUNTER — APPOINTMENT (OUTPATIENT)
Dept: PREADMISSION TESTING | Facility: HOSPITAL | Age: 70
End: 2020-08-07

## 2020-08-07 ENCOUNTER — HOSPITAL ENCOUNTER (OUTPATIENT)
Dept: GENERAL RADIOLOGY | Facility: HOSPITAL | Age: 70
Discharge: HOME | End: 2020-08-07
Admitting: ORTHOPAEDIC SURGERY

## 2020-08-07 ENCOUNTER — HOSPITAL ENCOUNTER (INPATIENT)
Facility: HOSPITAL | Age: 70
LOS: 1 days | Discharge: HOME HEALTH | End: 2020-08-11
Attending: ORTHOPAEDIC SURGERY | Admitting: ORTHOPAEDIC SURGERY

## 2020-08-07 VITALS
TEMPERATURE: 98.7 F | WEIGHT: 180.2 LBS | DIASTOLIC BLOOD PRESSURE: 79 MMHG | HEART RATE: 70 BPM | BODY MASS INDEX: 33.16 KG/M2 | SYSTOLIC BLOOD PRESSURE: 126 MMHG | OXYGEN SATURATION: 95 % | HEIGHT: 62 IN | RESPIRATION RATE: 16 BRPM

## 2020-08-07 DIAGNOSIS — M97.8XXD PERI-PROSTHETIC FEMUR FRACTURE AT TIP OF PROSTHESIS, SUBSEQUENT ENCOUNTER: Primary | ICD-10-CM

## 2020-08-07 DIAGNOSIS — S72.92XA FEMUR FRACTURE, LEFT (HCC): ICD-10-CM

## 2020-08-07 DIAGNOSIS — Z96.649 PERI-PROSTHETIC FEMUR FRACTURE AT TIP OF PROSTHESIS, SUBSEQUENT ENCOUNTER: Primary | ICD-10-CM

## 2020-08-07 LAB
ANION GAP SERPL CALCULATED.3IONS-SCNC: 10.2 MMOL/L (ref 5–15)
BACTERIA UR QL AUTO: ABNORMAL /HPF
BILIRUB UR QL STRIP: NEGATIVE
BUN SERPL-MCNC: 13 MG/DL (ref 8–23)
BUN/CREAT SERPL: 20.3 (ref 7–25)
CALCIUM SPEC-SCNC: 9.5 MG/DL (ref 8.6–10.5)
CHLORIDE SERPL-SCNC: 103 MMOL/L (ref 98–107)
CLARITY UR: CLEAR
CO2 SERPL-SCNC: 24.8 MMOL/L (ref 22–29)
COLOR UR: YELLOW
CREAT SERPL-MCNC: 0.64 MG/DL (ref 0.57–1)
DEPRECATED RDW RBC AUTO: 41.7 FL (ref 37–54)
ERYTHROCYTE [DISTWIDTH] IN BLOOD BY AUTOMATED COUNT: 12.1 % (ref 12.3–15.4)
GFR SERPL CREATININE-BSD FRML MDRD: 92 ML/MIN/1.73
GLUCOSE SERPL-MCNC: 103 MG/DL (ref 65–99)
GLUCOSE UR STRIP-MCNC: NEGATIVE MG/DL
HCT VFR BLD AUTO: 38.4 % (ref 34–46.6)
HGB BLD-MCNC: 12.8 G/DL (ref 12–15.9)
HGB UR QL STRIP.AUTO: ABNORMAL
HYALINE CASTS UR QL AUTO: ABNORMAL /LPF
KETONES UR QL STRIP: ABNORMAL
LEUKOCYTE ESTERASE UR QL STRIP.AUTO: NEGATIVE
MCH RBC QN AUTO: 30.9 PG (ref 26.6–33)
MCHC RBC AUTO-ENTMCNC: 33.3 G/DL (ref 31.5–35.7)
MCV RBC AUTO: 92.8 FL (ref 79–97)
NITRITE UR QL STRIP: NEGATIVE
PH UR STRIP.AUTO: 6.5 [PH] (ref 5–8)
PLATELET # BLD AUTO: 254 10*3/MM3 (ref 140–450)
PMV BLD AUTO: 10.3 FL (ref 6–12)
POTASSIUM SERPL-SCNC: 4.3 MMOL/L (ref 3.5–5.2)
PROT UR QL STRIP: NEGATIVE
RBC # BLD AUTO: 4.14 10*6/MM3 (ref 3.77–5.28)
RBC # UR: ABNORMAL /HPF
REF LAB TEST METHOD: ABNORMAL
SODIUM SERPL-SCNC: 138 MMOL/L (ref 136–145)
SP GR UR STRIP: 1.03 (ref 1–1.03)
SQUAMOUS #/AREA URNS HPF: ABNORMAL /HPF
UROBILINOGEN UR QL STRIP: ABNORMAL
WBC # BLD AUTO: 9 10*3/MM3 (ref 3.4–10.8)
WBC UR QL AUTO: ABNORMAL /HPF

## 2020-08-07 PROCEDURE — C9803 HOPD COVID-19 SPEC COLLECT: HCPCS

## 2020-08-07 PROCEDURE — 71046 X-RAY EXAM CHEST 2 VIEWS: CPT

## 2020-08-07 PROCEDURE — 87086 URINE CULTURE/COLONY COUNT: CPT | Performed by: ORTHOPAEDIC SURGERY

## 2020-08-07 PROCEDURE — 81001 URINALYSIS AUTO W/SCOPE: CPT | Performed by: ORTHOPAEDIC SURGERY

## 2020-08-07 PROCEDURE — 93005 ELECTROCARDIOGRAM TRACING: CPT

## 2020-08-07 PROCEDURE — 36415 COLL VENOUS BLD VENIPUNCTURE: CPT

## 2020-08-07 PROCEDURE — U0004 COV-19 TEST NON-CDC HGH THRU: HCPCS | Performed by: ORTHOPAEDIC SURGERY

## 2020-08-07 PROCEDURE — 85027 COMPLETE CBC AUTOMATED: CPT | Performed by: ORTHOPAEDIC SURGERY

## 2020-08-07 PROCEDURE — 80048 BASIC METABOLIC PNL TOTAL CA: CPT | Performed by: ORTHOPAEDIC SURGERY

## 2020-08-07 PROCEDURE — 93010 ELECTROCARDIOGRAM REPORT: CPT | Performed by: INTERNAL MEDICINE

## 2020-08-07 RX ORDER — HYDROCODONE BITARTRATE AND ACETAMINOPHEN 5; 325 MG/1; MG/1
1 TABLET ORAL EVERY 6 HOURS PRN
Status: ON HOLD | COMMUNITY
End: 2020-08-10

## 2020-08-07 NOTE — DISCHARGE INSTRUCTIONS
CHLORHEXIDINE CLOTH INSTRUCTIONS  The morning of surgery follow these instructions using the Chlorhexidine cloths you've been given.  These steps reduce bacteria on the body.  Do not use the cloths near your eyes, ears mouth, genitalia or on open wounds.  Throw the cloths away after use but do not try to flush them down a toilet.      • Open and remove one cloth at a time from the package.    • Leave the cloth unfolded and begin the bathing.  • Massage the skin with the cloths using gentle pressure to remove bacteria.  Do not scrub harshly.   • Follow the steps below with one 2% CHG cloth per area (6 total cloths).  • One cloth for neck, shoulders and chest.  • One cloth for both arms, hands, fingers and underarms (do underarms last).  • One cloth for the abdomen followed by groin.  • One cloth for right leg and foot including between the toes.  • One cloth for left leg and foot including between the toes.  • The last cloth is to be used for the back of the neck, back and buttocks.    Allow the CHG to air dry 3 minutes on the skin which will give it time to work and decrease the chance of irritation.  The skin may feel sticky until it is dry.  Do not rinse with water or any other liquid or you will lose the beneficial effects of the CHG.  If mild skin irritation occurs, do rinse the skin to remove the CHG.  Report this to the nurse at time of admission.  Do not apply lotions, creams, ointments, deodorants or perfumes after using the clothes. Dress in clean clothes before coming to the hospital.    BACTROBAN NASAL OINTMENT  There are many germs normally in your nose. Bactroban is an ointment that will help reduce these germs. Please follow these instructions for Bactroban use:      ____The day before surgery in the morning  Date___8/09_____    ____The day before surgery in the evening              Date___8/09_____    ____The day of surgery in the morning    Date__8/10______    **Squirt ½ package of Bactroban Ointment  onto a cotton applicator and apply to inside of 1st nostril.  Squirt the remaining Bactroban and apply to the inside of the other nostril.            General Instructions:  • Do not eat solid food after midnight the night before surgery.  • You may drink clear liquids day of surgery but must stop at least one hour before your hospital arrival time.  • It is beneficial for you to have a clear drink that contains carbohydrates the day of surgery.  We suggest a 12 to 20 ounce bottle of Gatorade or Powerade for non-diabetic patients or a 12 to 20 ounce bottle of G2 or Powerade Zero for diabetic patients. (Pediatric patients, are not advised to drink a 12 to 20 ounce carbohydrate drink)    Clear liquids are liquids you can see through.  Nothing red in color.     Plain water                               Sports drinks  Sodas                                   Gelatin (Jell-O)  Fruit juices without pulp such as white grape juice and apple juice  Popsicles that contain no fruit or yogurt  Tea or coffee (no cream or milk added)  Gatorade / Powerade  G2 / Powerade Zero      • Patients who avoid smoking, chewing tobacco and alcohol for 4 weeks prior to surgery have a reduced risk of post-operative complications.  Quit smoking as many days before surgery as you can.  • Do not smoke, use chewing tobacco or drink alcohol the day of surgery.   • If applicable bring your C-PAP/ BI-PAP machine.  • Bring any papers given to you in the doctor’s office.  • Wear clean comfortable clothes.  • Do not wear contact lenses, false eyelashes or make-up.  Bring a case for your glasses.   • Bring crutches or walker if applicable.  • Remove all piercings.  Leave jewelry and any other valuables at home.  • Hair extensions with metal clips must be removed prior to surgery.  • The Pre-Admission Testing nurse will instruct you to bring medications if unable to obtain an accurate list in Pre-Admission Testing.          Preventing a Surgical Site  Infection:  • For 2 to 3 days before surgery, avoid shaving with a razor because the razor can irritate skin and make it easier to develop an infection.    • Any areas of open skin can increase the risk of a post-operative wound infection by allowing bacteria to enter and travel throughout the body.  Notify your surgeon if you have any skin wounds / rashes even if it is not near the expected surgical site.  The area will need assessed to determine if surgery should be delayed until it is healed.  • The night prior to surgery shower using a fresh bar of anti-bacterial soap (such as Dial) and clean washcloth.  Sleep in a clean bed with clean clothing.  Do not allow pets to sleep with you.  • Shower on the morning of surgery using a fresh bar of anti-bacterial soap (such as Dial) and clean washcloth.  Dry with a clean towel and dress in clean clothing.  • Ask your surgeon if you will be receiving antibiotics prior to surgery.  • Make sure you, your family, and all healthcare providers clean their hands with soap and water or an alcohol based hand  before caring for you or your wound.    Day of surgery:  Your arrival time is approximately two hours before your scheduled surgery time.  Upon arrival, a Pre-op nurse and Anesthesiologist will review your health history, obtain vital signs, and answer questions you may have.  The only belongings needed at this time will be a list of your home medications and if applicable your C-PAP/BI-PAP machine.  If you are staying overnight your family can leave the rest of your belongings in the car and bring them to your room later.  A Pre-op nurse will start an IV and you may receive medication in preparation for surgery, including something to help you relax.  Your family will be able to see you in the Pre-op area.  Two visitors at a time will be allowed in the Pre-op room.  While you are in surgery your family should notify the waiting room  if they leave the  waiting room area and provide a contact phone number.    Please be aware that surgery does come with discomfort.  We want to make every effort to control your discomfort so please discuss any uncontrolled symptoms with your nurse.   Your doctor will most likely have prescribed pain medications.      If you are going home after surgery you will receive individualized written care instructions before being discharged.  A responsible adult must drive you to and from the hospital on the day of your surgery and stay with you for 24 hours.    If you are staying overnight following surgery, you will be transported to your hospital room following the recovery period.  [facility] has all private rooms.    If you have any questions please call Pre-Admission Testing at [phone].  Deductibles and co-payments are collected on the day of service. Please be prepared to pay the required co-pay, deductible or deposit on the day of service as defined by your plan.    Patient Education for Self-Quarantine Process    Following your COVID testing, we strongly recommend that you do not leave your home after you have been tested for COVID except to get medical care. This includes not going to work, school or to public areas.  If this is not possible for you to do please limit your activities to only required outings.  Be sure to wear a mask when you are with other people, practice social distancing and wash your hands frequently.      The following items provide additional details to keep you safe.  • Wash your hands with soap and water frequently for at least 20 seconds.   • Avoid touching your eyes, nose and mouth with unwashed hands.  • Do not share anything - utensils, towels, food from the same bowl.   • Have your own utensils, drinking glass, dishes, towels and bedding.   • Do not have visitors.   • Do use FaceTime to stay in touch with family and friends.  • You should stay in a specific room away from others if  possible.   • Stay at least 6 feet away from others in the home if you cannot have a dedicated room to yourself.   • Do not snuggle with your pet. While the CDC says there is no evidence that pets can spread COVID-19 or be infected from humans, it is probably best to avoid “petting, snuggling, being kissed or licked and sharing food (during self-quarantine)”, according to the CDC.   • Sanitize household surfaces daily. Include all high touch areas (door handles, light switches, phones, countertops, etc.)  • Do not share a bathroom with others, if possible.   • Wear a mask around others in your home if you are unable to stay in a separate room or 6 feet apart. If  you are unable to wear a mask, have your family member wear a mask if they must be within 6 feet of you.   Call your surgeon immediately if you experience any of the following symptoms:  • Sore Throat  • Shortness of Breath or difficulty breathing  • Cough  • Chills  • Body soreness or muscle pain  • Headache  • Fever  • New loss of taste or smell  • Do not arrive for your surgery ill.  Your procedure will need to be rescheduled to another time.  You will need to call your physician before the day of surgery to avoid any unnecessary exposure to hospital staff as well as other patients.

## 2020-08-08 LAB
BACTERIA SPEC AEROBE CULT: NO GROWTH
REF LAB TEST METHOD: NORMAL
SARS-COV-2 RNA RESP QL NAA+PROBE: NOT DETECTED

## 2020-08-10 ENCOUNTER — ANESTHESIA (OUTPATIENT)
Dept: PERIOP | Facility: HOSPITAL | Age: 70
End: 2020-08-10

## 2020-08-10 ENCOUNTER — ANESTHESIA EVENT (OUTPATIENT)
Dept: PERIOP | Facility: HOSPITAL | Age: 70
End: 2020-08-10

## 2020-08-10 ENCOUNTER — APPOINTMENT (OUTPATIENT)
Dept: GENERAL RADIOLOGY | Facility: HOSPITAL | Age: 70
End: 2020-08-10

## 2020-08-10 PROBLEM — M97.8XXD: Status: ACTIVE | Noted: 2020-08-10

## 2020-08-10 PROBLEM — Z96.649: Status: ACTIVE | Noted: 2020-08-10

## 2020-08-10 PROCEDURE — 0SRB049 REPLACEMENT OF LEFT HIP JOINT WITH CERAMIC ON POLYETHYLENE SYNTHETIC SUBSTITUTE, CEMENTED, OPEN APPROACH: ICD-10-PCS | Performed by: ORTHOPAEDIC SURGERY

## 2020-08-10 PROCEDURE — 25010000002 FENTANYL CITRATE (PF) 100 MCG/2ML SOLUTION: Performed by: NURSE ANESTHETIST, CERTIFIED REGISTERED

## 2020-08-10 PROCEDURE — C1776 JOINT DEVICE (IMPLANTABLE): HCPCS | Performed by: ORTHOPAEDIC SURGERY

## 2020-08-10 PROCEDURE — 87070 CULTURE OTHR SPECIMN AEROBIC: CPT | Performed by: ORTHOPAEDIC SURGERY

## 2020-08-10 PROCEDURE — 25010000003 BUPIVACAINE LIPOSOME 1.3 % SUSPENSION 20 ML VIAL: Performed by: ORTHOPAEDIC SURGERY

## 2020-08-10 PROCEDURE — 25010000002 PHENYLEPHRINE PER 1 ML: Performed by: NURSE ANESTHETIST, CERTIFIED REGISTERED

## 2020-08-10 PROCEDURE — 25010000002 PROPOFOL 10 MG/ML EMULSION: Performed by: NURSE ANESTHETIST, CERTIFIED REGISTERED

## 2020-08-10 PROCEDURE — 25010000003 CEFAZOLIN IN DEXTROSE 2-4 GM/100ML-% SOLUTION: Performed by: ORTHOPAEDIC SURGERY

## 2020-08-10 PROCEDURE — 0SPB0JZ REMOVAL OF SYNTHETIC SUBSTITUTE FROM LEFT HIP JOINT, OPEN APPROACH: ICD-10-PCS | Performed by: ORTHOPAEDIC SURGERY

## 2020-08-10 PROCEDURE — C9290 INJ, BUPIVACAINE LIPOSOME: HCPCS | Performed by: ORTHOPAEDIC SURGERY

## 2020-08-10 PROCEDURE — 25010000002 ROPIVACAINE PER 1 MG: Performed by: ORTHOPAEDIC SURGERY

## 2020-08-10 PROCEDURE — C1713 ANCHOR/SCREW BN/BN,TIS/BN: HCPCS | Performed by: ORTHOPAEDIC SURGERY

## 2020-08-10 PROCEDURE — 73501 X-RAY EXAM HIP UNI 1 VIEW: CPT

## 2020-08-10 PROCEDURE — 25010000002 DEXAMETHASONE PER 1 MG: Performed by: NURSE ANESTHETIST, CERTIFIED REGISTERED

## 2020-08-10 PROCEDURE — 25010000002 ONDANSETRON PER 1 MG: Performed by: NURSE ANESTHETIST, CERTIFIED REGISTERED

## 2020-08-10 PROCEDURE — 97161 PT EVAL LOW COMPLEX 20 MIN: CPT

## 2020-08-10 PROCEDURE — 25010000002 FENTANYL CITRATE (PF) 100 MCG/2ML SOLUTION

## 2020-08-10 PROCEDURE — 97110 THERAPEUTIC EXERCISES: CPT

## 2020-08-10 PROCEDURE — 25010000002 MIDAZOLAM PER 1 MG: Performed by: ANESTHESIOLOGY

## 2020-08-10 PROCEDURE — 25010000002 HYDROMORPHONE PER 4 MG: Performed by: ORTHOPAEDIC SURGERY

## 2020-08-10 PROCEDURE — 87205 SMEAR GRAM STAIN: CPT | Performed by: ORTHOPAEDIC SURGERY

## 2020-08-10 DEVICE — IMPLANTABLE DEVICE: Type: IMPLANTABLE DEVICE | Site: HIP | Status: FUNCTIONAL

## 2020-08-10 DEVICE — CMT BONE REFOBACIN R W/GENT 1X40: Type: IMPLANTABLE DEVICE | Site: HIP | Status: FUNCTIONAL

## 2020-08-10 DEVICE — DEV CONTRL TISS STRATAFIXSPIRALMNCRYL PLSPS2 REV3/0 15CM: Type: IMPLANTABLE DEVICE | Site: HIP | Status: FUNCTIONAL

## 2020-08-10 DEVICE — BIOLOX® DELTA, CERAMIC FEMORAL HEAD, S, Ø 36/-3.5, TAPER 12/14
Type: IMPLANTABLE DEVICE | Site: HIP | Status: FUNCTIONAL
Brand: BIOLOX® DELTA

## 2020-08-10 DEVICE — DEV CONTRL TISS STRATAFIX SYMM PDS PLUS VIL CT-1 60CM: Type: IMPLANTABLE DEVICE | Site: HIP | Status: FUNCTIONAL

## 2020-08-10 DEVICE — PINNACLE HIP SOLUTIONS ALTRX POLYETHYLENE ACETABULAR LINER +4 10 DEGREE 36MM ID 52MM OD
Type: IMPLANTABLE DEVICE | Site: HIP | Status: FUNCTIONAL
Brand: PINNACLE ALTRX

## 2020-08-10 RX ORDER — SODIUM CHLORIDE, SODIUM LACTATE, POTASSIUM CHLORIDE, CALCIUM CHLORIDE 600; 310; 30; 20 MG/100ML; MG/100ML; MG/100ML; MG/100ML
100 INJECTION, SOLUTION INTRAVENOUS CONTINUOUS
Status: DISCONTINUED | OUTPATIENT
Start: 2020-08-10 | End: 2020-08-11 | Stop reason: HOSPADM

## 2020-08-10 RX ORDER — NALOXONE HCL 0.4 MG/ML
0.1 VIAL (ML) INJECTION
Status: DISCONTINUED | OUTPATIENT
Start: 2020-08-10 | End: 2020-08-11 | Stop reason: HOSPADM

## 2020-08-10 RX ORDER — HYDRALAZINE HYDROCHLORIDE 20 MG/ML
5 INJECTION INTRAMUSCULAR; INTRAVENOUS
Status: DISCONTINUED | OUTPATIENT
Start: 2020-08-10 | End: 2020-08-10 | Stop reason: HOSPADM

## 2020-08-10 RX ORDER — UREA 10 %
1 LOTION (ML) TOPICAL NIGHTLY PRN
Status: DISCONTINUED | OUTPATIENT
Start: 2020-08-10 | End: 2020-08-11 | Stop reason: HOSPADM

## 2020-08-10 RX ORDER — MAGNESIUM HYDROXIDE 1200 MG/15ML
LIQUID ORAL AS NEEDED
Status: DISCONTINUED | OUTPATIENT
Start: 2020-08-10 | End: 2020-08-10 | Stop reason: HOSPADM

## 2020-08-10 RX ORDER — ONDANSETRON 2 MG/ML
INJECTION INTRAMUSCULAR; INTRAVENOUS AS NEEDED
Status: DISCONTINUED | OUTPATIENT
Start: 2020-08-10 | End: 2020-08-10 | Stop reason: SURG

## 2020-08-10 RX ORDER — HYDROCODONE BITARTRATE AND ACETAMINOPHEN 7.5; 325 MG/1; MG/1
1 TABLET ORAL ONCE AS NEEDED
Status: DISCONTINUED | OUTPATIENT
Start: 2020-08-10 | End: 2020-08-10 | Stop reason: HOSPADM

## 2020-08-10 RX ORDER — HYDROCODONE BITARTRATE AND ACETAMINOPHEN 10; 325 MG/1; MG/1
1 TABLET ORAL EVERY 4 HOURS PRN
Status: DISCONTINUED | OUTPATIENT
Start: 2020-08-10 | End: 2020-08-11 | Stop reason: HOSPADM

## 2020-08-10 RX ORDER — OXYCODONE AND ACETAMINOPHEN 7.5; 325 MG/1; MG/1
1 TABLET ORAL ONCE AS NEEDED
Status: DISCONTINUED | OUTPATIENT
Start: 2020-08-10 | End: 2020-08-10 | Stop reason: HOSPADM

## 2020-08-10 RX ORDER — FAMOTIDINE 10 MG/ML
20 INJECTION, SOLUTION INTRAVENOUS ONCE
Status: COMPLETED | OUTPATIENT
Start: 2020-08-10 | End: 2020-08-10

## 2020-08-10 RX ORDER — FAMOTIDINE 20 MG/1
40 TABLET, FILM COATED ORAL DAILY
Status: DISCONTINUED | OUTPATIENT
Start: 2020-08-10 | End: 2020-08-11 | Stop reason: HOSPADM

## 2020-08-10 RX ORDER — FENTANYL CITRATE 50 UG/ML
INJECTION, SOLUTION INTRAMUSCULAR; INTRAVENOUS AS NEEDED
Status: DISCONTINUED | OUTPATIENT
Start: 2020-08-10 | End: 2020-08-10 | Stop reason: SURG

## 2020-08-10 RX ORDER — LIDOCAINE HYDROCHLORIDE 20 MG/ML
INJECTION, SOLUTION INFILTRATION; PERINEURAL AS NEEDED
Status: DISCONTINUED | OUTPATIENT
Start: 2020-08-10 | End: 2020-08-10 | Stop reason: SURG

## 2020-08-10 RX ORDER — MIDAZOLAM HYDROCHLORIDE 1 MG/ML
1 INJECTION INTRAMUSCULAR; INTRAVENOUS
Status: DISCONTINUED | OUTPATIENT
Start: 2020-08-10 | End: 2020-08-10 | Stop reason: HOSPADM

## 2020-08-10 RX ORDER — LIDOCAINE HYDROCHLORIDE 10 MG/ML
0.5 INJECTION, SOLUTION EPIDURAL; INFILTRATION; INTRACAUDAL; PERINEURAL ONCE AS NEEDED
Status: DISCONTINUED | OUTPATIENT
Start: 2020-08-10 | End: 2020-08-10 | Stop reason: HOSPADM

## 2020-08-10 RX ORDER — SODIUM CHLORIDE 0.9 % (FLUSH) 0.9 %
3-10 SYRINGE (ML) INJECTION AS NEEDED
Status: DISCONTINUED | OUTPATIENT
Start: 2020-08-10 | End: 2020-08-11 | Stop reason: HOSPADM

## 2020-08-10 RX ORDER — PROPOFOL 10 MG/ML
VIAL (ML) INTRAVENOUS AS NEEDED
Status: DISCONTINUED | OUTPATIENT
Start: 2020-08-10 | End: 2020-08-10 | Stop reason: SURG

## 2020-08-10 RX ORDER — ONDANSETRON 4 MG/1
4 TABLET, FILM COATED ORAL EVERY 6 HOURS PRN
Status: DISCONTINUED | OUTPATIENT
Start: 2020-08-10 | End: 2020-08-11 | Stop reason: HOSPADM

## 2020-08-10 RX ORDER — DIPHENHYDRAMINE HYDROCHLORIDE 50 MG/ML
12.5 INJECTION INTRAMUSCULAR; INTRAVENOUS
Status: DISCONTINUED | OUTPATIENT
Start: 2020-08-10 | End: 2020-08-10 | Stop reason: HOSPADM

## 2020-08-10 RX ORDER — PROPARACAINE HYDROCHLORIDE 5 MG/ML
1 SOLUTION/ DROPS OPHTHALMIC ONCE
Status: COMPLETED | OUTPATIENT
Start: 2020-08-10 | End: 2020-08-10

## 2020-08-10 RX ORDER — ONDANSETRON 2 MG/ML
4 INJECTION INTRAMUSCULAR; INTRAVENOUS ONCE AS NEEDED
Status: DISCONTINUED | OUTPATIENT
Start: 2020-08-10 | End: 2020-08-10 | Stop reason: HOSPADM

## 2020-08-10 RX ORDER — SODIUM CHLORIDE 0.9 % (FLUSH) 0.9 %
3-10 SYRINGE (ML) INJECTION AS NEEDED
Status: DISCONTINUED | OUTPATIENT
Start: 2020-08-10 | End: 2020-08-10 | Stop reason: HOSPADM

## 2020-08-10 RX ORDER — KETOROLAC TROMETHAMINE 15 MG/ML
15 INJECTION, SOLUTION INTRAMUSCULAR; INTRAVENOUS EVERY 6 HOURS PRN
Status: DISCONTINUED | OUTPATIENT
Start: 2020-08-10 | End: 2020-08-11 | Stop reason: HOSPADM

## 2020-08-10 RX ORDER — ASPIRIN 81 MG/1
81 TABLET ORAL EVERY 12 HOURS SCHEDULED
Status: DISCONTINUED | OUTPATIENT
Start: 2020-08-11 | End: 2020-08-11 | Stop reason: HOSPADM

## 2020-08-10 RX ORDER — ONDANSETRON 2 MG/ML
4 INJECTION INTRAMUSCULAR; INTRAVENOUS EVERY 6 HOURS PRN
Status: DISCONTINUED | OUTPATIENT
Start: 2020-08-10 | End: 2020-08-11 | Stop reason: HOSPADM

## 2020-08-10 RX ORDER — TRANEXAMIC ACID 100 MG/ML
INJECTION, SOLUTION INTRAVENOUS AS NEEDED
Status: DISCONTINUED | OUTPATIENT
Start: 2020-08-10 | End: 2020-08-10 | Stop reason: SURG

## 2020-08-10 RX ORDER — ACETAMINOPHEN 325 MG/1
650 TABLET ORAL ONCE AS NEEDED
Status: DISCONTINUED | OUTPATIENT
Start: 2020-08-10 | End: 2020-08-10 | Stop reason: HOSPADM

## 2020-08-10 RX ORDER — ACETAMINOPHEN 325 MG/1
325 TABLET ORAL EVERY 4 HOURS PRN
Status: DISCONTINUED | OUTPATIENT
Start: 2020-08-10 | End: 2020-08-11 | Stop reason: HOSPADM

## 2020-08-10 RX ORDER — SODIUM CHLORIDE, SODIUM LACTATE, POTASSIUM CHLORIDE, CALCIUM CHLORIDE 600; 310; 30; 20 MG/100ML; MG/100ML; MG/100ML; MG/100ML
9 INJECTION, SOLUTION INTRAVENOUS CONTINUOUS
Status: DISCONTINUED | OUTPATIENT
Start: 2020-08-10 | End: 2020-08-10 | Stop reason: HOSPADM

## 2020-08-10 RX ORDER — CEFAZOLIN SODIUM 2 G/100ML
2 INJECTION, SOLUTION INTRAVENOUS ONCE
Status: COMPLETED | OUTPATIENT
Start: 2020-08-10 | End: 2020-08-10

## 2020-08-10 RX ORDER — EPHEDRINE SULFATE 50 MG/ML
5 INJECTION, SOLUTION INTRAVENOUS ONCE AS NEEDED
Status: DISCONTINUED | OUTPATIENT
Start: 2020-08-10 | End: 2020-08-10 | Stop reason: HOSPADM

## 2020-08-10 RX ORDER — NALOXONE HCL 0.4 MG/ML
0.2 VIAL (ML) INJECTION AS NEEDED
Status: DISCONTINUED | OUTPATIENT
Start: 2020-08-10 | End: 2020-08-10 | Stop reason: HOSPADM

## 2020-08-10 RX ORDER — FLUMAZENIL 0.1 MG/ML
0.2 INJECTION INTRAVENOUS AS NEEDED
Status: DISCONTINUED | OUTPATIENT
Start: 2020-08-10 | End: 2020-08-10 | Stop reason: HOSPADM

## 2020-08-10 RX ORDER — FENTANYL CITRATE 50 UG/ML
INJECTION, SOLUTION INTRAMUSCULAR; INTRAVENOUS
Status: COMPLETED
Start: 2020-08-10 | End: 2020-08-10

## 2020-08-10 RX ORDER — HYDROMORPHONE HYDROCHLORIDE 1 MG/ML
0.5 INJECTION, SOLUTION INTRAMUSCULAR; INTRAVENOUS; SUBCUTANEOUS
Status: DISCONTINUED | OUTPATIENT
Start: 2020-08-10 | End: 2020-08-10 | Stop reason: HOSPADM

## 2020-08-10 RX ORDER — EPHEDRINE SULFATE 50 MG/ML
INJECTION, SOLUTION INTRAVENOUS AS NEEDED
Status: DISCONTINUED | OUTPATIENT
Start: 2020-08-10 | End: 2020-08-10 | Stop reason: SURG

## 2020-08-10 RX ORDER — FENTANYL CITRATE 50 UG/ML
50 INJECTION, SOLUTION INTRAMUSCULAR; INTRAVENOUS
Status: DISCONTINUED | OUTPATIENT
Start: 2020-08-10 | End: 2020-08-10 | Stop reason: HOSPADM

## 2020-08-10 RX ORDER — SODIUM CHLORIDE 0.9 % (FLUSH) 0.9 %
3 SYRINGE (ML) INJECTION EVERY 12 HOURS SCHEDULED
Status: DISCONTINUED | OUTPATIENT
Start: 2020-08-10 | End: 2020-08-11 | Stop reason: HOSPADM

## 2020-08-10 RX ORDER — CEFAZOLIN SODIUM 2 G/100ML
INJECTION, SOLUTION INTRAVENOUS
Status: COMPLETED
Start: 2020-08-10 | End: 2020-08-10

## 2020-08-10 RX ORDER — SODIUM CHLORIDE 0.9 % (FLUSH) 0.9 %
3 SYRINGE (ML) INJECTION EVERY 12 HOURS SCHEDULED
Status: DISCONTINUED | OUTPATIENT
Start: 2020-08-10 | End: 2020-08-10 | Stop reason: HOSPADM

## 2020-08-10 RX ORDER — CEFAZOLIN SODIUM 2 G/100ML
2 INJECTION, SOLUTION INTRAVENOUS EVERY 8 HOURS
Status: COMPLETED | OUTPATIENT
Start: 2020-08-10 | End: 2020-08-11

## 2020-08-10 RX ORDER — ROCURONIUM BROMIDE 10 MG/ML
INJECTION, SOLUTION INTRAVENOUS AS NEEDED
Status: DISCONTINUED | OUTPATIENT
Start: 2020-08-10 | End: 2020-08-10 | Stop reason: SURG

## 2020-08-10 RX ORDER — HYDROCODONE BITARTRATE AND ACETAMINOPHEN 10; 325 MG/1; MG/1
2 TABLET ORAL EVERY 4 HOURS PRN
Status: DISCONTINUED | OUTPATIENT
Start: 2020-08-10 | End: 2020-08-11 | Stop reason: HOSPADM

## 2020-08-10 RX ORDER — HYDROMORPHONE HYDROCHLORIDE 1 MG/ML
0.5 INJECTION, SOLUTION INTRAMUSCULAR; INTRAVENOUS; SUBCUTANEOUS
Status: DISCONTINUED | OUTPATIENT
Start: 2020-08-10 | End: 2020-08-11 | Stop reason: HOSPADM

## 2020-08-10 RX ORDER — ERYTHROMYCIN 5 MG/G
OINTMENT OPHTHALMIC EVERY 12 HOURS
Status: DISCONTINUED | OUTPATIENT
Start: 2020-08-10 | End: 2020-08-11 | Stop reason: HOSPADM

## 2020-08-10 RX ORDER — DEXAMETHASONE SODIUM PHOSPHATE 10 MG/ML
INJECTION INTRAMUSCULAR; INTRAVENOUS AS NEEDED
Status: DISCONTINUED | OUTPATIENT
Start: 2020-08-10 | End: 2020-08-10 | Stop reason: SURG

## 2020-08-10 RX ORDER — DIPHENHYDRAMINE HCL 25 MG
25 CAPSULE ORAL
Status: DISCONTINUED | OUTPATIENT
Start: 2020-08-10 | End: 2020-08-10 | Stop reason: HOSPADM

## 2020-08-10 RX ADMIN — TRANEXAMIC ACID 1000 MG: 100 INJECTION, SOLUTION INTRAVENOUS at 09:45

## 2020-08-10 RX ADMIN — SODIUM CHLORIDE, POTASSIUM CHLORIDE, SODIUM LACTATE AND CALCIUM CHLORIDE 9 ML/HR: 600; 310; 30; 20 INJECTION, SOLUTION INTRAVENOUS at 08:26

## 2020-08-10 RX ADMIN — FENTANYL CITRATE 25 MCG: 50 INJECTION INTRAMUSCULAR; INTRAVENOUS at 12:44

## 2020-08-10 RX ADMIN — FAMOTIDINE 20 MG: 10 INJECTION INTRAVENOUS at 08:26

## 2020-08-10 RX ADMIN — PROPARACAINE HYDROCHLORIDE 1 DROP: 5 SOLUTION/ DROPS OPHTHALMIC at 15:19

## 2020-08-10 RX ADMIN — HYDROMORPHONE HYDROCHLORIDE 0.5 MG: 1 INJECTION, SOLUTION INTRAMUSCULAR; INTRAVENOUS; SUBCUTANEOUS at 19:21

## 2020-08-10 RX ADMIN — EPHEDRINE SULFATE 5 MG: 50 INJECTION INTRAVENOUS at 10:42

## 2020-08-10 RX ADMIN — FENTANYL CITRATE 50 MCG: 50 INJECTION, SOLUTION INTRAMUSCULAR; INTRAVENOUS at 13:26

## 2020-08-10 RX ADMIN — HYDROMORPHONE HYDROCHLORIDE 0.5 MG: 1 INJECTION, SOLUTION INTRAMUSCULAR; INTRAVENOUS; SUBCUTANEOUS at 15:19

## 2020-08-10 RX ADMIN — LIDOCAINE HYDROCHLORIDE 100 MG: 20 INJECTION, SOLUTION INFILTRATION; PERINEURAL at 09:35

## 2020-08-10 RX ADMIN — PHENYLEPHRINE HYDROCHLORIDE 100 MCG: 10 INJECTION INTRAVENOUS at 11:47

## 2020-08-10 RX ADMIN — ERYTHROMYCIN: 5 OINTMENT OPHTHALMIC at 17:11

## 2020-08-10 RX ADMIN — DEXAMETHASONE SODIUM PHOSPHATE 8 MG: 10 INJECTION INTRAMUSCULAR; INTRAVENOUS at 09:46

## 2020-08-10 RX ADMIN — CEFAZOLIN SODIUM 2 G: 2 INJECTION, SOLUTION INTRAVENOUS at 09:39

## 2020-08-10 RX ADMIN — HYDROCODONE BITARTRATE AND ACETAMINOPHEN 2 TABLET: 10; 325 TABLET ORAL at 17:11

## 2020-08-10 RX ADMIN — FENTANYL CITRATE 100 MCG: 50 INJECTION INTRAMUSCULAR; INTRAVENOUS at 09:35

## 2020-08-10 RX ADMIN — FENTANYL CITRATE 50 MCG: 50 INJECTION INTRAMUSCULAR; INTRAVENOUS at 11:05

## 2020-08-10 RX ADMIN — FENTANYL CITRATE 50 MCG: 50 INJECTION INTRAMUSCULAR; INTRAVENOUS at 10:03

## 2020-08-10 RX ADMIN — HYDROCODONE BITARTRATE AND ACETAMINOPHEN 2 TABLET: 10; 325 TABLET ORAL at 21:05

## 2020-08-10 RX ADMIN — CEFAZOLIN SODIUM 2 G: 2 INJECTION, SOLUTION INTRAVENOUS at 17:23

## 2020-08-10 RX ADMIN — ROCURONIUM BROMIDE 50 MG: 10 INJECTION INTRAVENOUS at 09:35

## 2020-08-10 RX ADMIN — MIDAZOLAM 1 MG: 1 INJECTION INTRAMUSCULAR; INTRAVENOUS at 08:26

## 2020-08-10 RX ADMIN — ONDANSETRON HYDROCHLORIDE 4 MG: 2 SOLUTION INTRAMUSCULAR; INTRAVENOUS at 12:28

## 2020-08-10 RX ADMIN — PROPOFOL 150 MG: 10 INJECTION, EMULSION INTRAVENOUS at 09:35

## 2020-08-10 RX ADMIN — SODIUM CHLORIDE, POTASSIUM CHLORIDE, SODIUM LACTATE AND CALCIUM CHLORIDE: 600; 310; 30; 20 INJECTION, SOLUTION INTRAVENOUS at 10:20

## 2020-08-10 RX ADMIN — FENTANYL CITRATE 25 MCG: 50 INJECTION INTRAMUSCULAR; INTRAVENOUS at 12:40

## 2020-08-10 RX ADMIN — PHENYLEPHRINE HYDROCHLORIDE 100 MCG: 10 INJECTION INTRAVENOUS at 12:03

## 2020-08-10 NOTE — PLAN OF CARE
Problem: Patient Care Overview  Goal: Plan of Care Review  Outcome: Ongoing (interventions implemented as appropriate)  Flowsheets  Taken 8/10/2020 1630 by Amy Hirsch, RN  Progress: improving  Outcome Summary: Pain controlled with PO and IV pain medication. Ambulates with assist x1 and walker. VSS. F/C in place until tomorrow morning. DC home when ready. Patient complaining of Left eye pain, corneal abrasion protocol started.  Taken 8/10/2020 1627 by Devante Russell, PT  Plan of Care Reviewed With: patient

## 2020-08-10 NOTE — H&P
ORTHOPEDIC SURGERY PRE-OP HISTORY AND PHYSICAL      Patient: Susie Godinez  Date of Admission: 8/10/2020  6:47 AM  YOB: 1950  Medical Record Number: [id number]  Attending Physician: Kenny García MD  Consulting Physician: Kenny García MD    CHIEF COMPLAINT: Left hip Pain.    HISTORY OF PRESENT ILLINESS: Patient is a 69 y.o. year old female presents to [facility] with above complaints.  The patient failed conservative treatment and patient requested surgical intervention.  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  She has underwent two attempts with ORIF as well as cortical strut grafting.  The plate and cortical strut have both broken and the non-union has displaced.   The patient presents for a  Left proximal femur replacement.    ALLERGIES:   Allergies   Allergen Reactions   • Codeine Nausea And Vomiting   • Penicillins Hives       HOME MEDICATIONS:  Medications Prior to Admission   Medication Sig Dispense Refill Last Dose   • HYDROcodone-acetaminophen (NORCO) 5-325 MG per tablet Take 1 tablet by mouth Every 6 (Six) Hours As Needed.   8/10/2020 at 0130   • Chlorhexidine Gluconate (HIBICLENS EX) Apply  topically. AS DIRECTED PREOP   Unknown at Unknown time   • Romosozumab-aqqg (Evenity) 105 MG/1.17ML subcutaneous solution Inject  under the skin into the appropriate area as directed Every 30 (Thirty) Days.   More than a month at Unknown time       Past Medical History:   Diagnosis Date   • Arthritis      Past Surgical History:   Procedure Laterality Date   •  SECTION     • CHOLECYSTECTOMY     • FEMUR OPEN REDUCTION INTERNAL FIXATION Left 12/15/2019    Procedure: revision eleazar-prosthetic left femur with cortical graft;  Surgeon: Kenny García MD;  Location: [facility];  Service: Orthopedics     Social History     Occupational History   • Not on file   Tobacco Use   • Smoking status: Former Smoker     Years: 40.00        Last attempt to quit: 2011     Years since quittin.0   • Smokeless tobacco: Never Used   Substance and Sexual Activity   • Alcohol use: Never     Frequency: Never   • Drug use: Never   • Sexual activity: Not on file      Social History     Social History Narrative   • Not on file     Family History   Problem Relation Age of Onset   • Malig Hyperthermia Neg Hx        REVIEW OF SYSTEMS:    HEENT: Patient denies any headaches, vision changes, change in hearing, or tinnitus, Patient denies epistaxis, sinus pain, hoarseness, or dysphagia   Pulmonary: Patient denies any cough, congestion, acute change in SOA or wheezing.   Cardiovascular: Patient denies any change in chest pain, dyspnea, palpitations, weakness, intolerance of exercise, varicosities, change in murmur   Gastrointestinal:  Patient denies change in appetite, melena, change in bowel habits.   Genital/Urinary: Patient denies dysuria, change in color of urine, change in frequency of urination, pain with urgency, change in incontinence, retention.   Musculoskeletal: Patient denies complaints of acute changes in symptoms of other joints not mentioned above.   Neurological: Patient denies changes in dizziness, tremor, ataxia, or difficulty in speaking or changes in memory.   Endocrine system: Patient denies acute changes in tremors, palpitations, polyuria, polydipsia, polyphagia, diaphoresis, exophthalmos, or goiter.   Psychological: Patient denies thoughts/plans or harming self or other; denies acute changes in depression,  insomnia, night terrors, alonzo, disorientation.   Skin: Patient denies any bruising, rashes, discoloration, pruritus,or wounds not mentioned in history of present illness or chief complaint above.   Hematopoietic: Patient denies current bleeding, epistaxis, hematuria, or melena.    PHYSICAL EXAM:   Vitals:  Vitals:    08/10/20 0728 08/10/20 0828   BP: 124/63    BP Location: Left arm    Patient Position: Sitting    Pulse: 64 62    Resp: 16    Temp: 98.6 °F (37 °C)    TempSrc: Oral    SpO2: 96% 97%   Weight: 82.2 kg (181 lb 4.8 oz)        General:  69 y.o. female who appears about stated age.    Alert, cooperative, in no acute distress                       Head:    Normocephalic, without obvious abnormality, atraumatic   Eyes:            Lids and lashes normal, conjunctivae and sclerae normal, no         icterus, no pallor, corneas clear, PERRLA   Ears:    Ears appear intact with no abnormalities noted   Throat:   No oral lesions, no thrush, oral mucosa moist   Neck:   No adenopathy, supple, trachea midline, no JVD   Back:     Limited exam shows no severe kyphosis present,no visible           erythema, no excessive  tenderness to palpation.    Lungs:     Respirations regular, even and unlabored.     Heart:    Normal rate, Pulses palpable   Chest Wall:    No abnormalities observed.   Abdomen:     Normal bowel sounds, no masses, no organomegaly, soft              non-tender, non-distended, no guarding, no rebound                      tenderness   Rectal:     Deferred   Pulses:   Pulses palpable and equal bilaterally   Skin:   No bleeding, bruising or rash   Lymph nodes:   No palpable adenopathy   Extremities:     Left hip: Skin intact.  Painful ROM.  NVI distally.      DIAGNOSTIC TEST:  No visits with results within 2 Day(s) from this visit.   Latest known visit with results is:   Appointment on 08/07/2020   Component Date Value Ref Range Status   • WBC 08/07/2020 9.00  3.40 - 10.80 10*3/mm3 Final   • RBC 08/07/2020 4.14  3.77 - 5.28 10*6/mm3 Final   • Hemoglobin 08/07/2020 12.8  12.0 - 15.9 g/dL Final   • Hematocrit 08/07/2020 38.4  34.0 - 46.6 % Final   • MCV 08/07/2020 92.8  79.0 - 97.0 fL Final   • MCH 08/07/2020 30.9  26.6 - 33.0 pg Final   • MCHC 08/07/2020 33.3  31.5 - 35.7 g/dL Final   • RDW 08/07/2020 12.1* 12.3 - 15.4 % Final   • RDW-SD 08/07/2020 41.7  37.0 - 54.0 fl Final   • MPV 08/07/2020 10.3  6.0 - 12.0 fL Final   • Platelets  08/07/2020 254  140 - 450 10*3/mm3 Final   • Glucose 08/07/2020 103* 65 - 99 mg/dL Final   • BUN 08/07/2020 13  8 - 23 mg/dL Final   • Creatinine 08/07/2020 0.64  0.57 - 1.00 mg/dL Final   • Sodium 08/07/2020 138  136 - 145 mmol/L Final   • Potassium 08/07/2020 4.3  3.5 - 5.2 mmol/L Final   • Chloride 08/07/2020 103  98 - 107 mmol/L Final   • CO2 08/07/2020 24.8  22.0 - 29.0 mmol/L Final   • Calcium 08/07/2020 9.5  8.6 - 10.5 mg/dL Final   • eGFR Non African Amer 08/07/2020 92  >60 mL/min/1.73 Final   • BUN/Creatinine Ratio 08/07/2020 20.3  7.0 - 25.0 Final   • Anion Gap 08/07/2020 10.2  5.0 - 15.0 mmol/L Final   • Urine Culture 08/07/2020 No growth   Final   • Color, UA 08/07/2020 Yellow  Yellow, Straw Final   • Appearance, UA 08/07/2020 Clear  Clear Final   • pH, UA 08/07/2020 6.5  5.0 - 8.0 Final   • Specific Gravity, UA 08/07/2020 1.026  1.005 - 1.030 Final   • Glucose, UA 08/07/2020 Negative  Negative Final   • Ketones, UA 08/07/2020 40 mg/dL (2+)* Negative Final   • Bilirubin, UA 08/07/2020 Negative  Negative Final   • Blood, UA 08/07/2020 Trace* Negative Final   • Protein, UA 08/07/2020 Negative  Negative Final   • Leuk Esterase, UA 08/07/2020 Negative  Negative Final   • Nitrite, UA 08/07/2020 Negative  Negative Final   • Urobilinogen, UA 08/07/2020 1.0 E.U./dL  0.2 - 1.0 E.U./dL Final   • RBC, UA 08/07/2020 6-12* None Seen, 0-2 /HPF Final   • WBC, UA 08/07/2020 0-2  None Seen, 0-2 /HPF Final   • Bacteria, UA 08/07/2020 None Seen  None Seen /HPF Final   • Squamous Epithelial Cells, UA 08/07/2020 0-2  None Seen, 0-2 /HPF Final   • Hyaline Casts, UA 08/07/2020 None Seen  None Seen /LPF Final   • Methodology 08/07/2020 Automated Microscopy   Final   • COVID19 08/07/2020 Not Detected  Not Detected - Ref. Range Final       No results found.      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    Risks and benefits of surgical intervention were discussed in detail with the patient.  Risks of infection, fracture, dislocation, extremity length discrepancy, neurovascular injury, persistent pain, medical risks, anesthetic risk, need for additional surgery, deep venous thrombosis, pulmonary embolism and death.      The above diagnosis and treatment plan was discussed with the patient and/or family.  They were educated in both non-surgical and surgical treatment options for their condition.   They were given the opportunity to ask questions and were answered to their satisfaction.  They agreed to proceed with the above treatment plan.        Kenny García MD  Date: 8/10/2020

## 2020-08-10 NOTE — ANESTHESIA PREPROCEDURE EVALUATION
Anesthesia Evaluation     Patient summary reviewed and Nursing notes reviewed                Airway   Mallampati: II  Dental      Pulmonary    (+) a smoker Former,   Cardiovascular - negative cardio ROS    ECG reviewed  Rhythm: regular  Rate: normal        Neuro/Psych- negative ROS  GI/Hepatic/Renal/Endo - negative ROS     Musculoskeletal     Abdominal    Substance History - negative use     OB/GYN negative ob/gyn ROS         Other   arthritis,                      Anesthesia Plan    ASA 3     general     intravenous induction     Anesthetic plan, all risks, benefits, and alternatives have been provided, discussed and informed consent has been obtained with: patient.

## 2020-08-10 NOTE — ANESTHESIA POSTPROCEDURE EVALUATION
Patient: Susie Godinez    Procedure Summary     Date:  08/10/20 Room / Location:  [facility] OR [address] [place] [address] MAIN OR    Anesthesia Start:  0931 Anesthesia Stop:  1319    Procedure:  LEFT HIP REMOVAL OF HARDWARE CONVERSION TO LEFT PROMIXAL FEMORAL REPLACMENT POSTERIOR APPROACH (Left Hip) Diagnosis:      Surgeon:  Kenny García MD Provider:  Homero Garcia MD    Anesthesia Type:  general ASA Status:  3          Anesthesia Type: general    Vitals  Vitals Value Taken Time   /78 8/10/2020  2:00 PM   Temp 36.3 °C (97.4 °F) 8/10/2020  1:17 PM   Pulse 71 8/10/2020  2:11 PM   Resp 16 8/10/2020  2:00 PM   SpO2 100 % 8/10/2020  2:11 PM   Vitals shown include unvalidated device data.        Post Anesthesia Care and Evaluation    Patient location during evaluation: PACU  Patient participation: complete - patient participated  Level of consciousness: awake and alert  Pain management: adequate  Airway patency: patent  Anesthetic complications: No anesthetic complications    Cardiovascular status: acceptable  Respiratory status: acceptable  Hydration status: acceptable    Comments: --------------------            08/10/20               1400     --------------------   BP:       151/78     Pulse:      82       Resp:       16       Temp:                SpO2:      100%     --------------------

## 2020-08-10 NOTE — ANESTHESIA PROCEDURE NOTES
Airway  Urgency: elective    Date/Time: 8/10/2020 9:37 AM  Airway not difficult    General Information and Staff    Patient location during procedure: OR  Anesthesiologist: Homero Garcia MD  CRNA: Lo Kumar CRNA    Indications and Patient Condition  Indications for airway management: airway protection    Preoxygenated: yes  MILS not maintained throughout  Mask difficulty assessment: 1 - vent by mask    Final Airway Details  Final airway type: endotracheal airway      Successful airway: ETT  Cuffed: yes   Successful intubation technique: direct laryngoscopy  Endotracheal tube insertion site: oral  Blade: Ml  ETT size (mm): 7.0  Cormack-Lehane Classification: grade I - full view of glottis  Placement verified by: chest auscultation and capnometry   Measured from: lips  ETT/EBT  to lips (cm): 19  Number of attempts at approach: 1  Assessment: lips, teeth, and gum same as pre-op and atraumatic intubation    Additional Comments  Dentition intact and unchanged. CBEBS.  +ETCO2.

## 2020-08-10 NOTE — THERAPY EVALUATION
Patient Name: Susie Godinez  : 1950    MRN: [id number]                              Today's Date: 8/10/2020       Admit Date: 8/10/2020    Visit Dx:     ICD-10-CM ICD-9-CM   1. 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 is s/p Left Total hip revision  -MS     Mode of Treatment  physical therapy;individual therapy  -MS     Row Name 08/10/20 1626          General Information    Patient Profile Reviewed?  yes  -MS     Prior Level of Function  independent:  -MS     Existing Precautions/Restrictions  (S) fall NO Hip Abduction ther. ex. and Strict Posterior THR precautions per Ortho MD;  Exit alarm   -MS     Barriers to Rehab  none identified  -MS     Row Name 08/10/20 1623          Cognitive Assessment/Intervention- PT/OT    Orientation Status (Cognition)  oriented x 3  -MS     Row Name 08/10/20 1623          Safety Issues, Functional Mobility    Comment, Safety Issues/Impairments (Mobility)  Gait belt used for safety.   -MS       User Key  (r) = Recorded By, (t) = Taken By, (c) = Cosigned By    Initials Name Provider Type    MS Devante Russell, PT Physical Therapist        Mobility     Row Name 08/10/20 162          Bed Mobility Assessment/Treatment    Bed Mobility Assessment/Treatment  supine-sit  -MS     Supine-Sit [place] (Bed  Mobility)  moderate assist (50% patient effort);2 person assist  -MS     Assistive Device (Bed Mobility)  draw sheet  -MS     Row Name 08/10/20 1624          Sit-Stand Transfer    Sit-Stand [place] (Transfers)  minimum assist (75% patient effort);2 person assist  -MS     Assistive Device (Sit-Stand Transfers)  walker, front-wheeled  -MS     Row Name 08/10/20 1624          Gait/Stairs Assessment/Training    [place] Level (Gait)  minimum assist (75% patient effort);2 person assist  -MS     Assistive Device (Gait)  walker, front-wheeled  -MS     Distance in Feet (Gait)  5 feet  -MS     Pattern (Gait)  step-to  -MS     Deviations/Abnormal Patterns (Gait)  antalgic;demarcus decreased  -MS     Bilateral Gait Deviations  forward flexed posture  -MS     Comment (Gait/Stairs)  Verbal/tactile cues for posture correction and for proper gait sequencing with use of Rwx.   -MS     Row Name 08/10/20 1624          Mobility Assessment/Intervention    Extremity Weight-bearing Status  left lower extremity  -MS     Left Lower Extremity (Weight-bearing Status)  weight-bearing as tolerated (WBAT)  -MS       User Key  (r) = Recorded By, (t) = Taken By, (c) = Cosigned By    Initials Name Provider Type    Devante Lew, PT Physical Therapist        Obj/Interventions     Row Name 08/10/20 1624          General ROM    GENERAL ROM COMMENTS  BUE/RLE (WFL's)  -MS     Row Name 08/10/20 1624          MMT (Manual Muscle Testing)    General MMT Comments  BUE/RLE (3+/5)   -MS     Row Name 08/10/20 1624          Therapeutic Exercise    Comment (Therapeutic Exercise)  (S) Left THR ther. ex. program x 10 reps completed with assist (NO HIP ABDUCTION per ortho MD)  -MS       User Key  (r) = Recorded By, (t) = Taken By, (c) = Cosigned By    Initials Name Provider Type    Devante Lew, PT Physical Therapist        Goals/Plan     Row Name 08/10/20 1626          Bed Mobility Goal 1 (PT)    Activity/Assistive Device (Bed Mobility Goal  1, PT)  bed mobility activities, all  -MS     [place] Level/Cues Needed (Bed Mobility Goal 1, PT)  independent  -MS     Time Frame (Bed Mobility Goal 1, PT)  long term goal (LTG);3 days  -MS     [facility] Name 08/10/20 1626          Transfer Goal 1 (PT)    Activity/Assistive Device (Transfer Goal 1, PT)  transfers, all;walker, rolling  -MS     [place] Level/Cues Needed (Transfer Goal 1, PT)  independent  -MS     Time Frame (Transfer Goal 1, PT)  long term goal (LTG);3 days  -MS     [facility] Name 08/10/20 1626          Gait Training Goal 1 (PT)    Activity/Assistive Device (Gait Training Goal 1, PT)  gait (walking locomotion);walker, rolling  -MS     [place] Level (Gait Training Goal 1, PT)  independent  -MS     Distance (Gait Goal 1, PT)  100 feet  -MS     Time Frame (Gait Training Goal 1, PT)  long term goal (LTG);3 days  -MS       User Key  (r) = Recorded By, (t) = Taken By, (c) = Cosigned By    Initials Name Provider Type    Devante Lew, PT Physical Therapist        Clinical Impression     [facility] Name 08/10/20 1622          Pain Assessment    Additional Documentation  Pain Scale: Numbers Pre/Post-Treatment (Group)  -MS     Row Name 08/10/20 1625          Pain Scale: Numbers Pre/Post-Treatment    Pain Scale: Numbers, Pretreatment  5/10  -MS     Pain Scale: Numbers, Post-Treatment  4/10  -MS     Pain Location - Side  Left  -MS     Pain Location  hip  -MS     Pain Intervention(s)  Medication (See MAR);Elevated;Rest;Cold pack;Repositioned  -MS     Row Name 08/10/20 162          Plan of Care Review    Plan of Care Reviewed With  patient  -MS     Row Name 08/10/20 1622          Physical Therapy Clinical Impression    Criteria for Skilled Interventions Met (PT Clinical Impression)  treatment indicated  -MS     Rehab Potential (PT Clinical Summary)  good, to achieve stated therapy goals  -MS     Row Name 08/10/20 1629          Positioning and Restraints    Pre-Treatment Position  in bed  -MS     Post Treatment  Position  chair  -MS     In Chair  notified nsg;reclined;sitting;call light within reach;encouraged to call for assist;exit alarm on;ABD pillow Ice pack to Left hip;  All lines intact.   -MS       User Key  (r) = Recorded By, (t) = Taken By, (c) = Cosigned By    Initials Name Provider Type    Devante Lew, PT Physical Therapist        Outcome Measures     Row Name 08/10/20 1626          How much help from another person do you currently need...    Turning from your back to your side while in flat bed without using bedrails?  2  -MS     Moving from lying on back to sitting on the side of a flat bed without bedrails?  2  -MS     Moving to and from a bed to a chair (including a wheelchair)?  2  -MS     Standing up from a chair using your arms (e.g., wheelchair, bedside chair)?  2  -MS     Climbing 3-5 steps with a railing?  2  -MS     To walk in hospital room?  2  -MS     AM-PAC 6 Clicks Score (PT)  12  -MS     Row Name 08/10/20 1626          Functional Assessment    Outcome Measure Options  AM-PAC 6 Clicks Basic Mobility (PT)  -MS       User Key  (r) = Recorded By, (t) = Taken By, (c) = Cosigned By    Initials Name Provider Type    MS Devante Rsusell, PT Physical Therapist        Physical Therapy Education                 Title: PT OT SLP Therapies (Done)     Topic: Physical Therapy (Done)     Point: Mobility training (Done)     Description:   Instruct learner(s) on safety and technique for assisting patient out of bed, chair or wheelchair.  Instruct in the proper use of assistive devices, such as walker, crutches, cane or brace.              Patient Friendly Description:   It's important to get you on your feet again, but we need to do so in a way that is safe for you. Falling has serious consequences, and your personal safety is the most important thing of all.        When it's time to get out of bed, one of us or a family member will sit next to you on the bed to give you support.     If your doctor or  nurse tells you to use a walker, crutches, a cane, or a brace, be sure you use it every time you get out of bed, even if you think you don't need it.    Learning Progress Summary           Patient Acceptance, E,D, VU,NR by MS at 8/10/2020 1627                   Point: Home exercise program (Done)     Description:   Instruct learner(s) on appropriate technique for monitoring, assisting and/or progressing patient with therapeutic exercises and activities.              Learning Progress Summary           Patient Acceptance, E,D, VU,NR by MS at 8/10/2020 1627                   Point: Body mechanics (Done)     Description:   Instruct learner(s) on proper positioning and spine alignment for patient and/or caregiver during mobility tasks and/or exercises.              Learning Progress Summary           Patient Acceptance, E,D, VU,NR by MS at 8/10/2020 1627                   Point: Precautions (Done)     Description:   Instruct learner(s) on prescribed precautions during mobility and gait tasks              Learning Progress Summary           Patient Acceptance, E,D, VU,NR by MS at 8/10/2020 1627                               User Key     Initials Effective Dates Name Provider Type Discipline    MS 04/03/18 -  Devante Russell, PT Physical Therapist PT              PT Recommendation and Plan  Planned Therapy Interventions (PT Eval): balance training, bed mobility training, gait training, home exercise program, patient/family education, postural re-education, transfer training, strengthening, ROM (range of motion)  Outcome Summary/Treatment Plan (PT)  Anticipated Equipment Needs at Discharge (PT): (Pt. reports she already owns a Rwx for home use. )  Anticipated Discharge Disposition (PT): home with assist, home with home health  Plan of Care Reviewed With: patient  Outcome Summary: Pt. presents with typical post op impairments related to THR surgery that include decreased ROM, decreased strength, and decreased balance.   Pt. will benefit from skilled inpt. P.T. to address her functional deficits and to assist pt. in regaining her maximum level of independence with functional mobility.     Time Calculation:   PT Charges     Row Name 08/10/20 1627             Time Calculation    Start Time  1540  -MS      Stop Time  1600  -MS      Time Calculation (min)  20 min  -MS      PT Received On  08/10/20  -MS      PT - Next Appointment  08/11/20  -MS      PT Goal Re-Cert Due Date  08/13/20  -MS         Time Calculation- PT    Total Timed Code Minutes- PT  18 minute(s)  -MS        User Key  (r) = Recorded By, (t) = Taken By, (c) = Cosigned By    Initials Name Provider Type    Devante Lew, PT Physical Therapist        Therapy Charges for Today     Code Description Service Date Service Provider Modifiers Qty    73390335387 HC PT EVAL LOW COMPLEXITY 1 8/10/2020 Devante Russell, PT GP 1    60286360391 HC PT THER PROC EA 15 MIN 8/10/2020 Devante Russell, PT GP 1    57563805245 HC PT THER SUPP EA 15 MIN 8/10/2020 Devante Russell, PT GP 1          PT G-Codes  Outcome Measure Options: AM-PAC 6 Clicks Basic Mobility (PT)  AM-PAC 6 Clicks Score (PT): 12    Devante Russell PT  8/10/2020

## 2020-08-10 NOTE — OP NOTE
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   Surgeon:  Kenny García MD  Assistant: Rob GARNICA CFA, Gregory Kessler MD PGY-5.  Anesthesia: General, Anesthesiologist: Homero Garcia MD  CRNA: Lo Kumar CRNA; Aurora Martines CRNA  Staff: Cell Saver : Gregory Correia  Circulator: Alvin Sewell RN; Malgorzata Lopez RN  Scrub Person: Amanda Westfall; Gita Spencer  Vendor Representative: Maxim Samayoa  [place]: Nisreen Brown RN  Estimated Blood Loss: 275 mL  Specimens:   Order Name Source Comment Collection Info Order Time   WOUND CULTURE Hip, Left  Collected By: Kenny García MD 8/10/2020 10:05 AM   WOUND CULTURE Hip, Left  Collected By: Kenny García MD 8/10/2020 10:05 AM     Drains: x 2  Complications: None  Components Utilized:  Implant Name Type Inv. Item Serial No.  Lot No. LRB No. Used   SUT CONTRL TISS STRATAFIX SYMM PDS PLUS AGATA CT-1 60CM - [id number] Implant SUT CONTRL TISS STRATAFIX SYMM PDS PLUS AGATA CT-1 60CM  [employer]  DIV OF J [employer] J  Left 1   SUT CONTRL TISS STRATAFIX SYMM PDS PLUS AGATA CT-1 60CM - [id number] Implant SUT CONTRL TISS STRATAFIX SYMM PDS PLUS AGATA CT-1 60CM  [employer]  DIV [employer] J [employer] J  Left 1   SUT CONTRL TISS STRATAFIXSPIRALMNCRYL PLSPS2 REV3/0 15CM - [id number] Implant SUT CONTRL TISS STRATAFIXSPIRALMNCRYL PLSPS2 REV3/0 15CM  [employer]  DIV OF J [employer] J  Left 1   LINER ACET ALTRX FACECHG 10D 52X36 PLS4 - [id number] Implant LINER ACET ALTRX FACECHG 10D 52X36 PLS4  DEPUY [id number] Left 1   CMT BONE REFOBACIN R W/GENT 1X40 - [id number] Implant CMT BONE REFOBACIN [place] W/GENT 1X40  [employer]  [id number] Left 3   COMP FEM PROX SEG TRAB MTL 38MM - [id number] Implant COMP FEM PROX SEG TRAB MTL 38MM  [employer] [id number] Left 1   COMP TIB SEG M/F TPR 80MM - [id number] Implant COMP TIB SEG M/F TPR 80MM  [employer] [id number] Left 1   COLR FEM SEG TM FOR [id number] - [id number] Implant COLR FEM SEG TM FOR [id number]  [employer] [id number] Left 1   EXT STEM FEM/KN SEG SYS FLUT STR [id number] - [id number] Implant EXT STEM FEM/KN SEG SYS FLUT STR [id number]  [employer] [id number] Left 1   WASHR FEM SEG TRAB MTL FLT 7MM - [id number] Implant WASHR FEM SEG TRAB MTL FLT 7MM  [employer] [id number] Left 1   HD FEM/HIP BIOLOX/DELTA CERAM [id number] MIN3.5MM - [id number] Implant HD FEM/HIP BIOLOX/DELTA CERAM 12/[id number] MIN3.5MM  [employer] [id number] Left 1   KT FEM ATT PROX SEG MTL CRV 4MM - [id number] Implant KT FEM ATT PROX SEG MTL CRV 4MM  [employer] [id number] Left 1   SUT CONTRL TISS STRATAFIX SYMM PDS PLUS AGATA CT-1 60CM - [id number] Implant SUT CONTRL TISS STRATAFIX SYMM PDS PLUS AGATA CT-1 60CM  [employer]  DIV OF J AND J BARBER Left 1       Indication for Procedure:   The patient is a 69 y.o. female presents today for total hip arthroplasty revision procedure because of her persistent eleazar-prosthetic femur fracture non-union below a well fixed femoral component of her total hip arthroplasty.  The plate had broken last week, and she had worsening pain and instability at the fracture site.    The patient was educated in risks of surgery that could include possible risk of infection, deep venous thrombosis, pulmonary embolism, fracture, neurovascular injury, leg length discrepancy, dislocation, possible persistent pain, need for additional surgeries, anesthetic risks, medical risks including heart attack and stroke, and death.  The discussion occurred in the office pre-operatively, and patient had the opportunity to ask questions, and concerns about the proposed surgery.  The patient also understood that medicine is  not an exact science, and that outcomes of the surgical procedure may be less than desired. The patient wished to proceed.      Protocols for intravenous antibiotics and venous thrombosis were followed for this patient.  IV antibiotics were infused prior to surgery and will be discontinued within 24 hours of completion of the surgical procedure.  Thrombosis prophylaxis will be initiated within 24 hours of the completion of the surgical procedure.      Procedure:   After the patient was identified in the preoperative holding area, the operative site was marked and confirmed. The patient was brought to the operating suite, placed supine on the operating table. General endotracheal anesthesia was placed uneventfully. The patient was placed in the lateral decubitus position and was secured to the operating table with a peg and post system. The operative extremity was then prepped and draped in the usual sterile fashion. Timeout procedure was performed. IV ancef antibiotics were infused. Tranexamic acid was infused. Then using a posterior lateral approach was utilized a #10 blade scalpel. The IT band was split longitudinally down to the knee. The gluteus david was split proximally. The short external rotators and posterior hip capsule was released and the hip was dislocated posteriorly. The entire femur was exposed proximally to distal to knee.  Then the lateral based locking plate and cables were removed.  Then an extended trochanteric osteotomy was completed and the femoral component was exposed.  Then the proximal femur was removed subperiosteally at the non-union site.  The polyethylene had some wear so it was removed using drill and screw technique.  A new polyethylene with elevated rim and 4 mm lateralized was impacted into place.  Then the femoral canal was reamed up to size 14 mm.  Then the above trial proximal body and head ball was placed, and reduced which was stable and good rotational stability. The trial  neck and head balls were placed on the broach.  Trial stem, head ball, and neck were removed. The final components were impacted together and the TM collar was cemented into place on the stem. Then 2 batches of biomet bone cement were mixed and the canal was filled retrograde and filled proximally.  The final stem was insterted into the canal and the cement was allowed to harden.  The trial head ball was placed and the Hip was again reduced, checked for stability, both anteriorly and posteriorly.  Then the final head ball was impacted onto the trunion and hip was reduced.  The greater trochanter was then reattached using the troch washers.  The trochanter fragmented so the repair was augmented with #5 fiberwire suture.  2 drains were placed, one proximally and one distally.  It was copiously irrigated with 3 liters of Betadine laced normal saline using pulsatile lavage  #1 strata-fix was used to close the IT band and the gluteus david longitudinal fashion, 2-0 Vicryl to close the deep dermis, and staples were used to close the skin. Sterile dressings were applied.  The patient was awakened from anesthesia and returned to recovery room in stable condition. No intraoperative complications.    Kenny García MD     Date: 8/10/2020  Time: 12:33

## 2020-08-10 NOTE — PLAN OF CARE
Problem: Patient Care Overview  Goal: Plan of Care Review  Flowsheets (Taken 8/10/2020 4807)  Plan of Care Reviewed With: patient  Outcome Summary: Pt. presents with typical post op impairments related to THR surgery that include decreased ROM, decreased strength, and decreased balance.  Pt. will benefit from skilled inpt. P.T. to address her functional deficits and to assist pt. in regaining her maximum level of independence with functional mobility.   Patient was wearing a face mask during this therapy encounter. Therapist used appropriate personal protective equipment including eye protection, mask, and gloves.  Mask used was standard procedure mask. Appropriate PPE was worn during the entire therapy session. Hand hygiene was completed before and after therapy session. Patient is not in enhanced droplet precautions.

## 2020-08-11 VITALS
DIASTOLIC BLOOD PRESSURE: 61 MMHG | BODY MASS INDEX: 33.35 KG/M2 | OXYGEN SATURATION: 97 % | HEIGHT: 62 IN | TEMPERATURE: 97.3 F | SYSTOLIC BLOOD PRESSURE: 107 MMHG | RESPIRATION RATE: 16 BRPM | WEIGHT: 181.22 LBS | HEART RATE: 64 BPM

## 2020-08-11 LAB
ANION GAP SERPL CALCULATED.3IONS-SCNC: 10.4 MMOL/L (ref 5–15)
BUN SERPL-MCNC: 16 MG/DL (ref 8–23)
BUN/CREAT SERPL: 27.1 (ref 7–25)
CALCIUM SPEC-SCNC: 8.5 MG/DL (ref 8.6–10.5)
CHLORIDE SERPL-SCNC: 100 MMOL/L (ref 98–107)
CO2 SERPL-SCNC: 23.6 MMOL/L (ref 22–29)
CREAT SERPL-MCNC: 0.59 MG/DL (ref 0.57–1)
GFR SERPL CREATININE-BSD FRML MDRD: 101 ML/MIN/1.73
GLUCOSE SERPL-MCNC: 139 MG/DL (ref 65–99)
HCT VFR BLD AUTO: 30.6 % (ref 34–46.6)
HGB BLD-MCNC: 10.2 G/DL (ref 12–15.9)
POTASSIUM SERPL-SCNC: 4.1 MMOL/L (ref 3.5–5.2)
SODIUM SERPL-SCNC: 134 MMOL/L (ref 136–145)

## 2020-08-11 PROCEDURE — 25010000003 CEFAZOLIN IN DEXTROSE 2-4 GM/100ML-% SOLUTION: Performed by: ORTHOPAEDIC SURGERY

## 2020-08-11 PROCEDURE — 80048 BASIC METABOLIC PNL TOTAL CA: CPT | Performed by: ORTHOPAEDIC SURGERY

## 2020-08-11 PROCEDURE — 85014 HEMATOCRIT: CPT | Performed by: ORTHOPAEDIC SURGERY

## 2020-08-11 PROCEDURE — 85018 HEMOGLOBIN: CPT | Performed by: ORTHOPAEDIC SURGERY

## 2020-08-11 PROCEDURE — 97110 THERAPEUTIC EXERCISES: CPT

## 2020-08-11 PROCEDURE — 25010000002 HYDROMORPHONE PER 4 MG: Performed by: ORTHOPAEDIC SURGERY

## 2020-08-11 RX ORDER — HYDROCODONE BITARTRATE AND ACETAMINOPHEN 10; 325 MG/1; MG/1
1-2 TABLET ORAL EVERY 4 HOURS PRN
Qty: 56 TABLET | Refills: 0 | Status: SHIPPED | OUTPATIENT
Start: 2020-08-11 | End: 2020-08-20

## 2020-08-11 RX ORDER — ASPIRIN 81 MG/1
81 TABLET ORAL EVERY 12 HOURS SCHEDULED
Qty: 60 TABLET | Refills: 0 | Status: SHIPPED | OUTPATIENT
Start: 2020-08-11

## 2020-08-11 RX ORDER — DOCUSATE SODIUM 100 MG/1
100 CAPSULE, LIQUID FILLED ORAL 2 TIMES DAILY
Qty: 60 CAPSULE | Refills: 1 | Status: SHIPPED | OUTPATIENT
Start: 2020-08-11

## 2020-08-11 RX ADMIN — HYDROCODONE BITARTRATE AND ACETAMINOPHEN 2 TABLET: 10; 325 TABLET ORAL at 03:35

## 2020-08-11 RX ADMIN — HYDROCODONE BITARTRATE AND ACETAMINOPHEN 2 TABLET: 10; 325 TABLET ORAL at 07:56

## 2020-08-11 RX ADMIN — ASPIRIN 81 MG: 81 TABLET, COATED ORAL at 07:55

## 2020-08-11 RX ADMIN — HYDROCODONE BITARTRATE AND ACETAMINOPHEN 2 TABLET: 10; 325 TABLET ORAL at 11:58

## 2020-08-11 RX ADMIN — HYDROMORPHONE HYDROCHLORIDE 0.5 MG: 1 INJECTION, SOLUTION INTRAMUSCULAR; INTRAVENOUS; SUBCUTANEOUS at 00:02

## 2020-08-11 RX ADMIN — CEFAZOLIN SODIUM 2 G: 2 INJECTION, SOLUTION INTRAVENOUS at 02:39

## 2020-08-11 RX ADMIN — FAMOTIDINE 40 MG: 20 TABLET, FILM COATED ORAL at 07:55

## 2020-08-11 NOTE — PLAN OF CARE
Problem: Patient Care Overview  Goal: Plan of Care Review  Outcome: Ongoing (interventions implemented as appropriate)  Flowsheets  Taken 8/10/2020 1630 by Amy Hirsch, RN  Progress: improving  Taken 8/10/2020 2040 by Caitlin Scruggs, RN  Plan of Care Reviewed With: patient  Taken 8/11/2020 0519 by Caitlin Scruggs, RN  Outcome Summary: POD#1 of hip revision. Pain controlled with PO/IV meds. Ambulats with assist x 1 and walker. Blood pressure running on low side, nasal cannula while sleeping, otherwise VSS. Corneal abrasion protocol follwed. Educated on DVT prevention. Plan is to d/c home when stable.

## 2020-08-11 NOTE — PLAN OF CARE
Problem: Patient Care Overview  Goal: Plan of Care Review  Flowsheets (Taken 8/11/2020 1123)  Plan of Care Reviewed With: patient  Outcome Summary: Pt. has currently met all inpt. P.T. goals and has no further questions/concerns regarding functional mobility or home safety. Pt. plans to discharge home with HHPT this date. Will sign off.   Patient was wearing a face mask during this therapy encounter. Therapist used appropriate personal protective equipment including eye protection, mask, and gloves.  Mask used was standard procedure mask. Appropriate PPE was worn during the entire therapy session. Hand hygiene was completed before and after therapy session. Patient is not in enhanced droplet precautions.

## 2020-08-11 NOTE — THERAPY DISCHARGE NOTE
"Patient Name: Susie Godinez  : 1950    MRN: [id number]                              Today's Date: 2020       Admit Date: 8/10/2020    Visit Dx:     ICD-10-CM ICD-9-CM   1. 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, subsequent encounter     Past Medical History:   Diagnosis Date   • Arthritis      Past Surgical History:   Procedure Laterality Date   •  SECTION     • CHOLECYSTECTOMY     • FEMUR OPEN REDUCTION INTERNAL FIXATION Left 12/15/2019    Procedure: revision josephine-prosthetic left femur with cortical graft;  Surgeon: Kenny García MD;  Location: [facility];  Service: Orthopedics   • TOTAL HIP ARTHROPLASTY REVISION Left 8/10/2020    Procedure: LEFT HIP REMOVAL OF HARDWARE CONVERSION TO LEFT PROMIXAL FEMORAL REPLACMENT POSTERIOR APPROACH;  Surgeon: Kenny García MD;  Location: [facility];  Service: Orthopedics;  Laterality: Left;     General Information     Row Name 20 1120          PT Evaluation Time/Intention    Document Type  therapy note (daily note) Pt. reports feeling \"good\" this date and is agreeable to work with P.T.   -MS     Mode of Treatment  physical therapy;individual therapy  -MS     Row Name 20 1120          General Information    Patient Profile Reviewed?  yes  -MS     Existing Precautions/Restrictions  (S) -- Posterior THR precautions; NO HIP ABDUCTION per MD orders;  Pt. is aware of this and able to verbalize these precautions  -MS     Barriers to Rehab  none identified  -MS     Row Name 20 1120          Cognitive Assessment/Intervention- PT/OT    Orientation Status (Cognition)  oriented x 3  -MS     Row Name 20 1120          Safety Issues, Functional Mobility    " Comment, Safety Issues/Impairments (Mobility)  Gait belt used for safety.   -MS       User Key  (r) = Recorded By, (t) = Taken By, (c) = Cosigned By    Initials Name Provider Type    Devante Lew, PT Physical Therapist        Mobility     Row Name 08/11/20 1120          Bed Mobility Assessment/Treatment    Bed Mobility Assessment/Treatment  supine-sit;sit-supine  -MS     Supine-Sit [place] (Bed Mobility)  conditional independence  -MS     Sit-Supine [place] (Bed Mobility)  conditional independence  -MS     Assistive Device (Bed Mobility)  bed rails  -MS     Row Name 08/11/20 1120          Sit-Stand Transfer    Sit-Stand [place] (Transfers)  independent  -MS     Assistive Device (Sit-Stand Transfers)  walker, front-wheeled  -MS     Row Name 08/11/20 1120          Gait/Stairs Assessment/Training    [place] Level (Gait)  independent  -MS     Assistive Device (Gait)  walker, front-wheeled  -MS     Distance in Feet (Gait)  100 feet  -MS     Pattern (Gait)  step-to  -MS     Deviations/Abnormal Patterns (Gait)  antalgic;demarcus decreased  -MS     Comment (Gait/Stairs)  Pt. reports no stairs to perform at home.   -MS     Row Name 08/11/20 1120          Mobility Assessment/Intervention    Extremity Weight-bearing Status  left lower extremity  -MS     Left Lower Extremity (Weight-bearing Status)  weight-bearing as tolerated (WBAT)  -MS       User Key  (r) = Recorded By, (t) = Taken By, (c) = Cosigned By    Initials Name Provider Type    Devante Lew PT Physical Therapist        Obj/Interventions     Row Name 08/11/20 1122          Therapeutic Exercise    Comment (Therapeutic Exercise)  (S) Left THR ther. ex. program x 15 reps completed with exception to Hip Abduction  -MS       User Key  (r) = Recorded By, (t) = Taken By, (c) = Cosigned By    Initials Name Provider Type    Devante Lew, PT Physical Therapist        Goals/Plan    No documentation.       Clinical Impression     Row  Name 08/11/20 1122          Pain Scale: Numbers Pre/Post-Treatment    Pain Scale: Numbers, Pretreatment  4/10  -MS     Pain Scale: Numbers, Post-Treatment  4/10  -MS     Pain Location - Side  Left  -MS     Pain Location  hip  -MS     Pain Intervention(s)  Medication (See MAR);Elevated;Rest;Cold pack;Repositioned  -MS     Row Name 08/11/20 1122          Positioning and Restraints    Pre-Treatment Position  in bed  -MS     Post Treatment Position  chair  -MS     In Chair  notified nsg;reclined;sitting;call light within reach;encouraged to call for assist;exit alarm on  -MS       User Key  (r) = Recorded By, (t) = Taken By, (c) = Cosigned By    Initials Name Provider Type    Devante Lew, PT Physical Therapist        Outcome Measures     Row Name 08/11/20 1123          How much help from another person do you currently need...    Turning from your back to your side while in flat bed without using bedrails?  4  -MS     Moving from lying on back to sitting on the side of a flat bed without bedrails?  4  -MS     Moving to and from a bed to a chair (including a wheelchair)?  4  -MS     Standing up from a chair using your arms (e.g., wheelchair, bedside chair)?  4  -MS     Climbing 3-5 steps with a railing?  3  -MS     To walk in hospital room?  4  -MS     AM-PAC 6 Clicks Score (PT)  23  -MS       User Key  (r) = Recorded By, (t) = Taken By, (c) = Cosigned By    Initials Name Provider Type    Devante Lew, PT Physical Therapist        Physical Therapy Education                 Title: PT OT SLP Therapies (Done)     Topic: Physical Therapy (Done)     Point: Mobility training (Done)     Description:   Instruct learner(s) on safety and technique for assisting patient out of bed, chair or wheelchair.  Instruct in the proper use of assistive devices, such as walker, crutches, cane or brace.              Patient Friendly Description:   It's important to get you on your feet again, but we need to do so in a way that  is safe for you. Falling has serious consequences, and your personal safety is the most important thing of all.        When it's time to get out of bed, one of us or a family member will sit next to you on the bed to give you support.     If your doctor or nurse tells you to use a walker, crutches, a cane, or a brace, be sure you use it every time you get out of bed, even if you think you don't need it.    Learning Progress Summary           Patient Acceptance, E,D, VU,DU by MS at 8/11/2020 1123    Acceptance, E,D, VU,NR by MS at 8/10/2020 1627                   Point: Home exercise program (Done)     Description:   Instruct learner(s) on appropriate technique for monitoring, assisting and/or progressing patient with therapeutic exercises and activities.              Learning Progress Summary           Patient Acceptance, E,D, VU,DU by MS at 8/11/2020 1123    Acceptance, E,D, VU,NR by MS at 8/10/2020 1627                   Point: Body mechanics (Done)     Description:   Instruct learner(s) on proper positioning and spine alignment for patient and/or caregiver during mobility tasks and/or exercises.              Learning Progress Summary           Patient Acceptance, E,D, VU,DU by MS at 8/11/2020 1123    Acceptance, E,D, VU,NR by MS at 8/10/2020 1627                   Point: Precautions (Done)     Description:   Instruct learner(s) on prescribed precautions during mobility and gait tasks              Learning Progress Summary           Patient Acceptance, E,D, VU,DU by MS at 8/11/2020 1123    Acceptance, E,D, VU,NR by MS at 8/10/2020 1627                               User Key     Initials Effective Dates Name Provider Type Discipline    MS 04/03/18 -  Devante Russell, PT Physical Therapist PT              PT Recommendation and Plan  Planned Therapy Interventions (PT Eval): balance training, bed mobility training, gait training, home exercise program, patient/family education, postural re-education, transfer  training, strengthening, ROM (range of motion)  Outcome Summary/Treatment Plan (PT)  Anticipated Equipment Needs at Discharge (PT): (Pt. reports she already owns a Rwx for home use. )  Anticipated Discharge Disposition (PT): home with assist, home with home health  Plan of Care Reviewed With: patient  Outcome Summary: Pt. has currently met all inpt. P.T. goals and has no further questions/concerns regarding functional mobility or home safety. Pt. plans to discharge home with HHPT this date. Will sign off.     Time Calculation:   PT Charges     Row Name 08/11/20 1124             Time Calculation    Start Time  1017  -MS      Stop Time  1032  -MS      Time Calculation (min)  15 min  -MS      PT Received On  08/11/20  -MS         Time Calculation- PT    Total Timed Code Minutes- PT  13 minute(s)  -MS        User Key  (r) = Recorded By, (t) = Taken By, (c) = Cosigned By    Initials Name Provider Type    Devante Lew, PT Physical Therapist        Therapy Charges for Today     Code Description Service Date Service Provider Modifiers Qty    08939795436 HC PT EVAL LOW COMPLEXITY 1 8/10/2020 Devante Russell, PT GP 1    26306276132 HC PT THER PROC EA 15 MIN 8/10/2020 Devante Russell, PT GP 1    72821466601 HC PT THER SUPP EA 15 MIN 8/10/2020 Devante Russell, PT GP 1    36154865774 HC PT THER PROC EA 15 MIN 8/11/2020 Devante Russell, PT GP 1          PT G-Codes  Outcome Measure Options: AM-PAC 6 Clicks Basic Mobility (PT)  AM-PAC 6 Clicks Score (PT): 23    PT Discharge Summary  Anticipated Discharge Disposition (PT): home with assist, home with home health  Reason for Discharge: All goals achieved, Discharge from facility  Outcomes Achieved: Refer to plan of care for updates on goals achieved  Discharge Destination: Home with assist, Home with home health    Devante Russell, PT  8/11/2020

## 2020-08-11 NOTE — PROGRESS NOTES
Procedure(s):  LEFT HIP REMOVAL OF HARDWARE CONVERSION TO LEFT PROMIXAL FEMORAL REPLACMENT POSTERIOR APPROACH     LOS: 1 day     Subjective :   Complains of pain controlled with meds.    Objective :    Vital signs in last 24 hours:  Vitals:    08/10/20 2357 08/11/20 0229 08/11/20 0300 08/11/20 0704   BP: 116/71 (!) 88/60  Comment: checked b/p 2x nurse notified 106/65 107/61   BP Location: Right arm Right arm Right arm Right arm   Patient Position: Lying Sitting Sitting Sitting   Pulse:  67  64   Resp:  16  16   Temp:  96.8 °F (36 °C)  97.3 °F (36.3 °C)   TempSrc:  Skin  Skin   SpO2:  96%  97%   Weight:       Height:           PHYSICAL EXAM:  Patient is calm, in no acute distress, awake and oriented x 3.  Dressing is clean, dry and intact.  No signs of infection.  Swelling is appropriate in amount.  Ecchymosis is appropriate in amount.  Homans test is negative.  Patient is neurovascularly intact distally.    LABS:  Results from last 7 days   Lab Units 08/11/20  0417 08/07/20  0719   WBC 10*3/mm3  --  9.00   HEMOGLOBIN g/dL 10.2* 12.8   HEMATOCRIT % 30.6* 38.4   PLATELETS 10*3/mm3  --  254     Results from last 7 days   Lab Units 08/11/20 0417   SODIUM mmol/L 134*   POTASSIUM mmol/L 4.1   CHLORIDE mmol/L 100   CO2 mmol/L 23.6   BUN mg/dL 16   CREATININE mg/dL 0.59   GLUCOSE mg/dL 139*   CALCIUM mg/dL 8.5*             ASSESSMENT:  Status post Procedure(s):  LEFT HIP REMOVAL OF HARDWARE CONVERSION TO LEFT PROMIXAL FEMORAL REPLACMENT POSTERIOR APPROACH      Plan:  Continue Physical Therapy, increase mobility and range of motion as tolerated.  Continue SCDs, Continue DVT prophylaxis.  Aspirin 81 mg BID after discharge.  Dispo planning for home today if cleared by P.T.    Kenny García MD    Date: 8/11/2020  Time: 07:26

## 2020-08-11 NOTE — PROGRESS NOTES
Continued Stay Note  [facility]     Patient Name: Susie Godinez  MRN: [id number]  Today's Date: 8/11/2020    Admit Date: 8/10/2020    Discharge Plan     Row Name 08/11/20 1218       Plan    Plan  Home with [facility]      Patient/Family in Agreement with Plan  yes    Plan Comments  CCP met with patient at bedside. Face sheet verified. Patient confirmed plan is to return home and is set up with [facility]. CCP spoke with Melissa/[facility]; they will see patient tomorrow. CCP faxed discharge summary to [facility]. Felisa DENT         Discharge Codes    No documentation.       Expected Discharge Date and Time     Expected Discharge Date Expected Discharge Time    Aug 11, 2020             FILIPE Webster

## 2020-08-11 NOTE — DISCHARGE SUMMARY
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 female admitted after undergoing the above surgical procedure. They were admitted for post-operative pain control, medical management and physical therapy.  They progressed with physical therapy.   They were deemed stable for discharge.      Consults:   Consults     No orders found for last 30 day(s).          Discharge Instructions:  Patient is weight bearing as tolerated on the operative leg.  Patient has posterior hip dislocation precautions in effect for 6 weeks post-op. No resisted abduction exercises.  Patient is to progress ambulation as tolerated.  Use walker as needed for stability and gait.  May progress to cane as tolerated.  The dressing is waterproof, and the patient may shower.  Keep dressing in place at least 7 days. May change dressing before saturated or starts to fall off.  Patient will follow-up in the office in 10-14 days. Home health physical therapy will follow patient once patient is discharged home.   Call the office at [phone] for any questions or concerns.      Discharge Medications     Discharge Medications      New Medications      Instructions Start Date   aspirin 81 MG EC tablet   81 mg, Oral, Every 12 Hours Scheduled      docusate sodium 100 MG capsule  Commonly known as:  Colace   100 mg, Oral, 2 Times Daily      HYDROcodone-acetaminophen  MG per tablet  Commonly known as:  NORCO   1-2 tablets, Oral, Every 4 Hours PRN         Continue These Medications       Instructions Start Date   Evenity 105 MG/1.17ML subcutaneous solution  Generic drug:  Romosozumab-aqqg   Subcutaneous, Every 30 Days             Discharge Diet:   Diet Instructions     Advance Diet As Tolerated      Resume regular diet.          Activity at Discharge:   Activity Instructions     Activity as Tolerated      Progress ambulation as tolerated.          Follow-up Appointments  No future appointments.  Additional Instructions for the Follow-ups that You Need to Schedule     Discharge Follow-up with Specialty:   As directed      Follow Up Details:  Follow-up With Ophthalmology if No Improvement in 1 Day         Discharge Follow-up with Specified Provider: Dr. Tay office.  Appointment was made at the time of the surgery scheduling.  Call office if you need to verify appointment time or date.  Office [phone].; 2 Weeks   As directed      To:  Dr. Tay office.  Appointment was made at the time of the surgery scheduling.  Call office if you need to verify appointment time or date.  Office [phone].    Follow Up:  2 Weeks         Referral to Home Health   As directed      Face to Face Visit Date:  8/11/2020    Follow-up provider for Plan of Care?:  I will be treating the patient on an ongoing basis.  Please send me the Plan of Care for signature.    Follow-up provider:  KENNY TAY [[id number]]    Reason/Clinical Findings:  S/p DAPHNE    Describe mobility limitations that make leaving home difficult:  taxing effort    Nursing/Therapeutic Services Requested:  Physical Therapy    PT orders:  Total joint pathway    Frequency:  1 Week 1               Test Results Pending at Discharge   Order Current Status    Wound Culture - Wound, Hip, Left Preliminary result    Wound Culture - Wound, Hip, Left Preliminary result           Kenny Tay MD  08/11/20,  07:30

## 2020-08-12 NOTE — PROGRESS NOTES
Case Management Discharge Note      Final Note: Home with [facility] LIVBreonna Larsonkert CSW          Destination      No service has been selected for the patient.      Durable Medical Equipment      No service has been selected for the patient.      Dialysis/Infusion      No service has been selected for the patient.      Home Medical Care - Selection Complete      Service Provider Request Status Selected Services Address Phone Number Fax Number    [facility] Selected Home Health Services [address] [place] [id number] [phone]      Therapy      No service has been selected for the patient.      Community Resources      No service has been selected for the patient.             Final Discharge Disposition Code: 06 - home with home health care

## 2020-08-13 LAB
BACTERIA SPEC AEROBE CULT: NORMAL
BACTERIA SPEC AEROBE CULT: NORMAL
GRAM STN SPEC: NORMAL
GRAM STN SPEC: NORMAL

## (undated) DEVICE — DRSNG SURESITE WNDW 4X4.5

## (undated) DEVICE — SUT VIC 0 CT1 CR8 18IN J840D

## (undated) DEVICE — Device: Brand: NCB®

## (undated) DEVICE — CEMENT MIXING SYSTEM WITH FEMORAL BREAKWAY NOZZLE: Brand: REVOLUTION

## (undated) DEVICE — PK HIP TOTL 40

## (undated) DEVICE — BIPOLAR SEALER 23-112-1 AQM 6.0: Brand: AQUAMANTYS™

## (undated) DEVICE — APPL CHLORAPREP W/TINT 26ML ORNG

## (undated) DEVICE — STAPLER, SKIN, 35W, A: Brand: MEDLINE INDUSTRIES, INC.

## (undated) DEVICE — GLV SURG SENSICARE W/ALOE PF LF 8 STRL

## (undated) DEVICE — ANTIBACTERIAL UNDYED BRAIDED (POLYGLACTIN 910), SYNTHETIC ABSORBABLE SUTURE: Brand: COATED VICRYL

## (undated) DEVICE — RECIPROCATING BLADE HEAVY DUTY LONG, OFFSET  (77.6 X 0.77 X 11.2MM)

## (undated) DEVICE — MAT FLR ABSORBENT LG 4FT 10 2.5FT

## (undated) DEVICE — CULT AER/ANAEROB FASTIDIOUS BACT

## (undated) DEVICE — ADHS SKIN DERMABOND TOP ADVANCED

## (undated) DEVICE — CONTAINER,SPECIMEN,OR STERILE,4OZ: Brand: MEDLINE

## (undated) DEVICE — GLV SURG BIOGEL LTX PF 8

## (undated) DEVICE — PENCL E/S ULTRAVAC TELESCP NOSE HOLSTR 10FT

## (undated) DEVICE — COUNT NDL MAG FM/BLCK 40COUNT

## (undated) DEVICE — SYR LUERLOK 20CC BX/50

## (undated) DEVICE — SCRW ACET CORT TRILOGY S/TAP 6.5X40
Type: IMPLANTABLE DEVICE | Site: HIP | Status: NON-FUNCTIONAL
Removed: 2020-08-10

## (undated) DEVICE — DRAPE,REIN 53X77,STERILE: Brand: MEDLINE

## (undated) DEVICE — SOL ISO/ALC RUB 70PCT 4OZ

## (undated) DEVICE — 3M™ IOBAN™ 2 ANTIMICROBIAL INCISE DRAPE 6640EZ: Brand: IOBAN™ 2

## (undated) DEVICE — 3 BONE CEMENT MIXER: Brand: MIXEVAC

## (undated) DEVICE — DRSNG SURESITE123 4X10IN

## (undated) DEVICE — SYR LUERLOK 50ML

## (undated) DEVICE — TRAP FLD MINIVAC MEGADYNE 100ML

## (undated) DEVICE — PREP SOL POVIDONE/IODINE BT 4OZ

## (undated) DEVICE — PK ATS CUST W CARDIOTOMY RESEVOIR

## (undated) DEVICE — DRSNG SURESITE WNDW 2.38X2.75

## (undated) DEVICE — SUT MNCRYL 3/0 PS2 18IN MCP497G

## (undated) DEVICE — SUT VIC 0 CT1 36IN J946H

## (undated) DEVICE — Device

## (undated) DEVICE — APPL CHLORAPREP HI/LITE 26ML ORNG

## (undated) DEVICE — BIT DRL SCRW PERIPH 2.7MM

## (undated) DEVICE — GLV SURG BIOGEL LTX PF 8 1/2

## (undated) DEVICE — BLOOD TRANSFUSION FILTER: Brand: HAEMONETICS

## (undated) DEVICE — STPLR SKIN VISISTAT WD 35CT

## (undated) DEVICE — SPNG GZ WOVN 4X4IN 12PLY 10/BX STRL

## (undated) DEVICE — BIT DRL SCRW CENTRL 3.2MM

## (undated) DEVICE — KT DRN EVAC WND PVC PCH WTROC RND 10F400

## (undated) DEVICE — TRY SKINPREP DRYPREP

## (undated) DEVICE — DRSNG PAD ABD 8X10IN STRL

## (undated) DEVICE — POOLE SUCTION HANDLE: Brand: CARDINAL HEALTH

## (undated) DEVICE — DRAPE,U/ SHT,SPLIT,PLAS,STERIL: Brand: MEDLINE

## (undated) DEVICE — DRSNG TELFA PAD NONADH STR 1S 3X8IN

## (undated) DEVICE — PREP IM ENCHANCED TOTAL HIP BONE                                    PREPARATION KIT: Brand: PREP-IM

## (undated) DEVICE — 6617 IOBAN II PATIENT ISOLATION DRAPE 5/BX,4BX/CS: Brand: STERI-DRAPE™ IOBAN™ 2

## (undated) DEVICE — DRSNG GZ PETROLTM XEROFORM CURAD 1X8IN STRL

## (undated) DEVICE — HANDPIECE SET WITH COAXIAL HIGH FLOW TIP AND SUCTION TUBE: Brand: INTERPULSE

## (undated) DEVICE — DRP C/ARMOR